# Patient Record
Sex: FEMALE | Race: WHITE | NOT HISPANIC OR LATINO | Employment: FULL TIME | URBAN - METROPOLITAN AREA
[De-identification: names, ages, dates, MRNs, and addresses within clinical notes are randomized per-mention and may not be internally consistent; named-entity substitution may affect disease eponyms.]

---

## 2020-05-25 NOTE — PROGRESS NOTES
"[facility] Note    Chief Complaint ;        Chief Complaint   Patient presents with    Establish Care       History of Present Illness      Tami Ludwig is a 39 y.o. WF presents to est PCP and for well visit.     Asthma - Pt on Albuterol prn and Advair BID.  Fall and winter and allergies trigger her. Seh can go 4 wks without needing it.     Exposure to Covid-19 Virus -Will check COVID ab. She was in [facility] in [place] and had Chest tightness and sore throat at the time.     HCM - Flu - 11/2019;  Tdap - '18?;   MGM - 9/19 - wnl per pt;  PAP - 9/19 - wnl;   HIV Screen - utd?; C-scope - none; Prev PCP - Dr. Jean Carlos Medrano;  Ob/GYN - Dr. Kidd; Derm - Dr. Mer Juan    Past Medical History:  Past Medical History:   Diagnosis Date    Asthma        Past Surgical History:   has a past surgical history that includes Augmentation of breast.    Family History:  family history includes Rheum arthritis in her father and sister; Thyroid disease in her sister.     Social History:  Social History     Tobacco Use    Smoking status: Never Smoker    Smokeless tobacco: Never Used   Substance Use Topics    Alcohol use: Yes     Frequency: 2-3 times a week     Drinks per session: 1 or 2    Drug use: Never       Review of Systems   Constitutional: Negative for chills, diaphoresis and fever.   HENT: Negative for congestion and sore throat.    Eyes: Negative for blurred vision and double vision.   Respiratory: Negative for cough, shortness of breath and wheezing.    Cardiovascular: Negative for chest pain and palpitations.   Gastrointestinal: Positive for diarrhea. Negative for abdominal pain, blood in stool, constipation, heartburn, melena, nausea and vomiting.        "I don't feel like it's problematic".    Genitourinary: Negative for dysuria and hematuria.   Musculoskeletal: Negative for joint pain and myalgias.        RT fifth finger pops and pt has noted some deformity.  Fam h/o RA.    Skin: Negative for " "itching and rash.   Neurological: Negative for dizziness and headaches.   Endo/Heme/Allergies: Does not bruise/bleed easily.   Psychiatric/Behavioral: Negative for depression. The patient is nervous/anxious.         "a little" just the past few mos.  She is concerned about her work.         Medications:  Outpatient Encounter Medications as of 5/27/2020   Medication Sig Dispense Refill    albuterol (PROVENTIL/VENTOLIN HFA) 90 mcg/actuation inhaler Inhale 2 puffs into the lungs every 6 (six) hours as needed for Wheezing. 54 g 0    cetirizine (ZYRTEC) 10 MG tablet Take 10 mg by mouth once daily.      fluticasone propion-salmeterol 115-21 mcg/dose (ADVAIR HFA) 115-21 mcg/actuation HFAA inhaler Inhale 2 puffs into the lungs every 12 (twelve) hours. Controller 3 Inhaler 3    [DISCONTINUED] albuterol (PROVENTIL/VENTOLIN HFA) 90 mcg/actuation inhaler Inhale into the lungs.      [DISCONTINUED] fluticasone propion-salmeterol 115-21 mcg/dose (ADVAIR HFA) 115-21 mcg/actuation HFAA inhaler Inhale 2 puffs into the lungs every 12 (twelve) hours. Controller       No facility-administered encounter medications on file as of 5/27/2020.        Current Outpatient Medications:     albuterol (PROVENTIL/VENTOLIN HFA) 90 mcg/actuation inhaler, Inhale 2 puffs into the lungs every 6 (six) hours as needed for Wheezing., Disp: 54 g, Rfl: 0    cetirizine (ZYRTEC) 10 MG tablet, Take 10 mg by mouth once daily., Disp: , Rfl:     fluticasone propion-salmeterol 115-21 mcg/dose (ADVAIR HFA) 115-21 mcg/actuation HFAA inhaler, Inhale 2 puffs into the lungs every 12 (twelve) hours. Controller, Disp: 3 Inhaler, Rfl: 3    Allergies:  Review of patient's allergies indicates:  No Known Allergies    Health Maintenance:    There is no immunization history on file for this patient.   Health Maintenance   Topic Date Due    Lipid Panel  1982    TETANUS VACCINE  11/03/2000    Pap Smear with HPV Cotest  11/03/2003      Objective:      Vital " "Signs  Temp: 97.5 °F (36.4 °C)  Pulse: 63  Resp: 12  SpO2: 99 %  BP: 112/86  BP Location: Right arm  Height and Weight  Height: 5' 6" (167.6 cm)  Weight: 69.3 kg (152 lb 12.5 oz)  BSA (Calculated - sq m): 1.8 sq meters  BMI (Calculated): 24.7  Weight in (lb) to have BMI = 25: 154.6]    Laboratory:    Physical Exam   Constitutional: She is oriented to person, place, and time. She appears well-developed and well-nourished. No distress.   HENT:   Head: Normocephalic and atraumatic.   Right Ear: External ear normal.   Left Ear: External ear normal.   Mouth/Throat: Oropharynx is clear and moist.   Eyes: Pupils are equal, round, and reactive to light. Conjunctivae and EOM are normal.   Neck: Normal range of motion. Neck supple. No JVD present. No thyromegaly present.   Cardiovascular: Normal rate, regular rhythm and intact distal pulses. Exam reveals no gallop and no friction rub.   No murmur heard.  Pulmonary/Chest: Effort normal and breath sounds normal. No respiratory distress. She has no wheezes. She has no rales.   Abdominal: Soft. Bowel sounds are normal. She exhibits no distension and no mass. There is no tenderness. There is no rebound and no guarding.   Musculoskeletal: Normal range of motion. She exhibits no edema or deformity.   Arthritic changes/Terry neck deformity to Rt fifth digit   Lymphadenopathy:     She has no cervical adenopathy.   Neurological: She is alert and oriented to person, place, and time. No cranial nerve deficit.   Skin: Skin is warm. Capillary refill takes less than 2 seconds. No rash noted. She is not diaphoretic. No cyanosis or erythema. Nails show no clubbing.   Psychiatric: She has a normal mood and affect.   Nursing note and vitals reviewed.          Assessment:       1. Normal physical exam, routine    2. Screening for lipoid disorders    3. Asthma, unspecified asthma severity, unspecified whether complicated, unspecified whether persistent    4. Terry-neck deformity of finger, right  "   5. Family history of rheumatism        Tami Ludwig is a 39 y.o.female with:    1. Normal physical exam, routine  - CBC auto differential; Future  - Comprehensive metabolic panel; Future  - T4, free; Future  - TSH; Future  -Performed today.  Will check Basic labs.  RTC in 1 yr for fu or sooner if needed.    2. Screening for lipoid disorders  - Lipid Panel; Future    3. Asthma, unspecified asthma severity, unspecified whether complicated, unspecified whether persistent  - fluticasone propion-salmeterol 115-21 mcg/dose (ADVAIR HFA) 115-21 mcg/actuation HFAA inhaler; Inhale 2 puffs into the lungs every 12 (twelve) hours. Controller  Dispense: 3 Inhaler; Refill: 3  - albuterol (PROVENTIL/VENTOLIN HFA) 90 mcg/actuation inhaler; Inhale 2 puffs into the lungs every 6 (six) hours as needed for Wheezing.  Dispense: 54 g; Refill: 0  - Controlled.  Cont current.     4. Lenox-neck deformity of finger, right  - Rheumatoid factor; Future  - Cyclic Citrullinated Peptide Antibody, IgG; Future  - Will check RF and anti-CCP    5. Family history of rheumatism  - Rheumatoid factor; Future  - Cyclic Citrullinated Peptide Antibody, IgG; Future  - Will check RF and anti-CCP    6. Exposure to Covid-19 Virus  - COVID-19 (SARS CoV-2) IgG Antibody; Future  I will put in an order.  Please note this may or may not be covered by your insurance.  You may want to check with them first if finances are an issue  Also, a positive result does not mean you can't get COVID -19 again.  This virus is very new and we are still learning about it.  There is not enough information at this time to determine what defines COVID - 19 immunity and how long immunity would last.  You should still socially distance. A positive result indicates you have had the virus  She was in [facility] in [place] and had Chest tightness and sore throat at the time.     Chronic conditions status updated as per HPI.  Other than changes above, cont current medications and maintain follow  up with specialists.  Return to clinic in 12 months for well visit or sooner if needed.    Sally Corley MD  [facility]

## 2020-05-27 ENCOUNTER — LAB VISIT (OUTPATIENT)
Dept: LAB | Facility: HOSPITAL | Age: 40
End: 2020-05-27
Attending: INTERNAL MEDICINE
Payer: COMMERCIAL

## 2020-05-27 ENCOUNTER — OFFICE VISIT (OUTPATIENT)
Dept: PRIMARY CARE CLINIC | Facility: CLINIC | Age: 40
End: 2020-05-27
Payer: COMMERCIAL

## 2020-05-27 VITALS
RESPIRATION RATE: 12 BRPM | TEMPERATURE: 98 F | OXYGEN SATURATION: 99 % | BODY MASS INDEX: 24.55 KG/M2 | HEART RATE: 63 BPM | HEIGHT: 66 IN | SYSTOLIC BLOOD PRESSURE: 112 MMHG | DIASTOLIC BLOOD PRESSURE: 86 MMHG | WEIGHT: 152.75 LBS

## 2020-05-27 DIAGNOSIS — Z20.822 EXPOSURE TO COVID-19 VIRUS: ICD-10-CM

## 2020-05-27 DIAGNOSIS — Z13.220 SCREENING FOR LIPOID DISORDERS: ICD-10-CM

## 2020-05-27 DIAGNOSIS — M20.031 SWAN-NECK DEFORMITY OF FINGER, RIGHT: ICD-10-CM

## 2020-05-27 DIAGNOSIS — Z00.00 NORMAL PHYSICAL EXAM, ROUTINE: ICD-10-CM

## 2020-05-27 DIAGNOSIS — Z00.00 NORMAL PHYSICAL EXAM, ROUTINE: Primary | ICD-10-CM

## 2020-05-27 DIAGNOSIS — Z82.69 FAMILY HISTORY OF RHEUMATISM: ICD-10-CM

## 2020-05-27 DIAGNOSIS — J45.909 ASTHMA, UNSPECIFIED ASTHMA SEVERITY, UNSPECIFIED WHETHER COMPLICATED, UNSPECIFIED WHETHER PERSISTENT: ICD-10-CM

## 2020-05-27 LAB
ALBUMIN SERPL BCP-MCNC: 4.3 G/DL (ref 3.5–5.2)
ALP SERPL-CCNC: 63 U/L (ref 55–135)
ALT SERPL W/O P-5'-P-CCNC: 15 U/L (ref 10–44)
ANION GAP SERPL CALC-SCNC: 8 MMOL/L (ref 8–16)
AST SERPL-CCNC: 17 U/L (ref 10–40)
BASOPHILS # BLD AUTO: 0.09 K/UL (ref 0–0.2)
BASOPHILS NFR BLD: 1.3 % (ref 0–1.9)
BILIRUB SERPL-MCNC: 0.6 MG/DL (ref 0.1–1)
BUN SERPL-MCNC: 8 MG/DL (ref 6–20)
CALCIUM SERPL-MCNC: 9.3 MG/DL (ref 8.7–10.5)
CCP AB SER IA-ACNC: 0.9 U/ML
CHLORIDE SERPL-SCNC: 106 MMOL/L (ref 95–110)
CHOLEST SERPL-MCNC: 164 MG/DL (ref 120–199)
CHOLEST/HDLC SERPL: 2.7 {RATIO} (ref 2–5)
CO2 SERPL-SCNC: 24 MMOL/L (ref 23–29)
CREAT SERPL-MCNC: 0.8 MG/DL (ref 0.5–1.4)
DIFFERENTIAL METHOD: ABNORMAL
EOSINOPHIL # BLD AUTO: 0.3 K/UL (ref 0–0.5)
EOSINOPHIL NFR BLD: 3.9 % (ref 0–8)
ERYTHROCYTE [DISTWIDTH] IN BLOOD BY AUTOMATED COUNT: 12.6 % (ref 11.5–14.5)
EST. GFR  (AFRICAN AMERICAN): >60 ML/MIN/1.73 M^2
EST. GFR  (NON AFRICAN AMERICAN): >60 ML/MIN/1.73 M^2
GLUCOSE SERPL-MCNC: 88 MG/DL (ref 70–110)
HCT VFR BLD AUTO: 43.2 % (ref 37–48.5)
HDLC SERPL-MCNC: 61 MG/DL (ref 40–75)
HDLC SERPL: 37.2 % (ref 20–50)
HGB BLD-MCNC: 13.4 G/DL (ref 12–16)
IMM GRANULOCYTES # BLD AUTO: 0.02 K/UL (ref 0–0.04)
IMM GRANULOCYTES NFR BLD AUTO: 0.3 % (ref 0–0.5)
LDLC SERPL CALC-MCNC: 92.4 MG/DL (ref 63–159)
LYMPHOCYTES # BLD AUTO: 1.5 K/UL (ref 1–4.8)
LYMPHOCYTES NFR BLD: 21.5 % (ref 18–48)
MCH RBC QN AUTO: 30.2 PG (ref 27–31)
MCHC RBC AUTO-ENTMCNC: 31 G/DL (ref 32–36)
MCV RBC AUTO: 97 FL (ref 82–98)
MONOCYTES # BLD AUTO: 0.6 K/UL (ref 0.3–1)
MONOCYTES NFR BLD: 8.3 % (ref 4–15)
NEUTROPHILS # BLD AUTO: 4.5 K/UL (ref 1.8–7.7)
NEUTROPHILS NFR BLD: 64.7 % (ref 38–73)
NONHDLC SERPL-MCNC: 103 MG/DL
NRBC BLD-RTO: 0 /100 WBC
PLATELET # BLD AUTO: 335 K/UL (ref 150–350)
PMV BLD AUTO: 11.2 FL (ref 9.2–12.9)
POTASSIUM SERPL-SCNC: 4 MMOL/L (ref 3.5–5.1)
PROT SERPL-MCNC: 7.4 G/DL (ref 6–8.4)
RBC # BLD AUTO: 4.44 M/UL (ref 4–5.4)
RHEUMATOID FACT SERPL-ACNC: <10 IU/ML (ref 0–15)
SARS-COV-2 IGG SERPLBLD QL IA.RAPID: NEGATIVE
SODIUM SERPL-SCNC: 138 MMOL/L (ref 136–145)
T4 FREE SERPL-MCNC: 0.96 NG/DL (ref 0.71–1.51)
TRIGL SERPL-MCNC: 53 MG/DL (ref 30–150)
TSH SERPL DL<=0.005 MIU/L-ACNC: 0.92 UIU/ML (ref 0.4–4)
WBC # BLD AUTO: 6.98 K/UL (ref 3.9–12.7)

## 2020-05-27 PROCEDURE — 80061 LIPID PANEL: CPT

## 2020-05-27 PROCEDURE — 80053 COMPREHEN METABOLIC PANEL: CPT

## 2020-05-27 PROCEDURE — 84439 ASSAY OF FREE THYROXINE: CPT

## 2020-05-27 PROCEDURE — 85025 COMPLETE CBC W/AUTO DIFF WBC: CPT

## 2020-05-27 PROCEDURE — 99385 PR PREVENTIVE VISIT,NEW,18-39: ICD-10-PCS | Mod: S$GLB,,, | Performed by: INTERNAL MEDICINE

## 2020-05-27 PROCEDURE — 99999 PR PBB SHADOW E&M-EST. PATIENT-LVL III: ICD-10-PCS | Mod: PBBFAC,,, | Performed by: INTERNAL MEDICINE

## 2020-05-27 PROCEDURE — 84443 ASSAY THYROID STIM HORMONE: CPT

## 2020-05-27 PROCEDURE — 86769 SARS-COV-2 COVID-19 ANTIBODY: CPT

## 2020-05-27 PROCEDURE — 36415 COLL VENOUS BLD VENIPUNCTURE: CPT | Mod: PN

## 2020-05-27 PROCEDURE — 99385 PREV VISIT NEW AGE 18-39: CPT | Mod: S$GLB,,, | Performed by: INTERNAL MEDICINE

## 2020-05-27 PROCEDURE — 86200 CCP ANTIBODY: CPT

## 2020-05-27 PROCEDURE — 86431 RHEUMATOID FACTOR QUANT: CPT

## 2020-05-27 PROCEDURE — 99999 PR PBB SHADOW E&M-EST. PATIENT-LVL III: CPT | Mod: PBBFAC,,, | Performed by: INTERNAL MEDICINE

## 2020-05-27 RX ORDER — CETIRIZINE HYDROCHLORIDE 10 MG/1
10 TABLET ORAL DAILY
COMMUNITY

## 2020-05-27 RX ORDER — ALBUTEROL SULFATE 90 UG/1
AEROSOL, METERED RESPIRATORY (INHALATION)
COMMUNITY
End: 2020-05-27 | Stop reason: SDUPTHER

## 2020-05-27 RX ORDER — ALBUTEROL SULFATE 90 UG/1
2 AEROSOL, METERED RESPIRATORY (INHALATION) EVERY 6 HOURS PRN
Qty: 54 G | Refills: 0 | Status: SHIPPED | OUTPATIENT
Start: 2020-05-27 | End: 2021-05-31 | Stop reason: SDUPTHER

## 2020-05-27 RX ORDER — FLUTICASONE PROPIONATE AND SALMETEROL XINAFOATE 115; 21 UG/1; UG/1
2 AEROSOL, METERED RESPIRATORY (INHALATION) EVERY 12 HOURS
COMMUNITY
End: 2020-05-27 | Stop reason: SDUPTHER

## 2020-05-27 RX ORDER — FLUTICASONE PROPIONATE AND SALMETEROL XINAFOATE 115; 21 UG/1; UG/1
2 AEROSOL, METERED RESPIRATORY (INHALATION) EVERY 12 HOURS
Qty: 3 INHALER | Refills: 3 | Status: SHIPPED | OUTPATIENT
Start: 2020-05-27 | End: 2021-05-31 | Stop reason: SDUPTHER

## 2020-05-28 ENCOUNTER — TELEPHONE (OUTPATIENT)
Dept: PRIMARY CARE CLINIC | Facility: CLINIC | Age: 40
End: 2020-05-28

## 2020-05-28 NOTE — TELEPHONE ENCOUNTER
"----- Message from Sally Corley MD sent at 5/28/2020  4:42 PM CDT -----  Please inform pt labs were wnl.  Her anti CCP and Rheumatoid factor - screening tests for Rheumatoid arthritis were both negative.  Her Covid Ab test was negative.  It does not appear she had COVID - 19. Her TFT"s - wnl.  She is not anemic. Her cholesterol is well controlled. Her kidney and liver functions ar normal. No further recs at this time.     Dr. HAM"

## 2020-05-28 NOTE — PROGRESS NOTES
"Please inform pt labs were wnl.  Her anti CCP and Rheumatoid factor - screening tests for Rheumatoid arthritis were both negative.  Her Covid Ab test was negative.  It does not appear she had COVID - 19. Her TFT"s - wnl.  She is not anemic. Her cholesterol is well controlled. Her kidney and liver functions ar normal. No further recs at this time.     Dr. HAM"

## 2021-05-18 ENCOUNTER — PATIENT MESSAGE (OUTPATIENT)
Dept: PRIMARY CARE CLINIC | Facility: CLINIC | Age: 41
End: 2021-05-18

## 2021-05-31 ENCOUNTER — LAB VISIT (OUTPATIENT)
Dept: LAB | Facility: HOSPITAL | Age: 41
End: 2021-05-31
Attending: NURSE PRACTITIONER
Payer: COMMERCIAL

## 2021-05-31 ENCOUNTER — OFFICE VISIT (OUTPATIENT)
Dept: INTERNAL MEDICINE | Facility: CLINIC | Age: 41
End: 2021-05-31
Payer: COMMERCIAL

## 2021-05-31 VITALS
BODY MASS INDEX: 23.38 KG/M2 | TEMPERATURE: 98 F | HEART RATE: 66 BPM | WEIGHT: 145.5 LBS | DIASTOLIC BLOOD PRESSURE: 66 MMHG | SYSTOLIC BLOOD PRESSURE: 104 MMHG | HEIGHT: 66 IN | RESPIRATION RATE: 16 BRPM

## 2021-05-31 DIAGNOSIS — R19.7 DIARRHEA, UNSPECIFIED TYPE: ICD-10-CM

## 2021-05-31 DIAGNOSIS — Z00.00 ANNUAL PHYSICAL EXAM: Primary | ICD-10-CM

## 2021-05-31 DIAGNOSIS — Z82.69 FAMILY HISTORY OF RHEUMATISM: ICD-10-CM

## 2021-05-31 DIAGNOSIS — Z83.2 FAMILY HISTORY OF AUTOIMMUNE DISORDER: ICD-10-CM

## 2021-05-31 DIAGNOSIS — Z13.6 ENCOUNTER FOR LIPID SCREENING FOR CARDIOVASCULAR DISEASE: ICD-10-CM

## 2021-05-31 DIAGNOSIS — Z13.220 ENCOUNTER FOR LIPID SCREENING FOR CARDIOVASCULAR DISEASE: ICD-10-CM

## 2021-05-31 DIAGNOSIS — J45.20 MILD INTERMITTENT ASTHMA, UNSPECIFIED WHETHER COMPLICATED: ICD-10-CM

## 2021-05-31 DIAGNOSIS — Z00.00 ANNUAL PHYSICAL EXAM: ICD-10-CM

## 2021-05-31 DIAGNOSIS — J45.909 ASTHMA, UNSPECIFIED ASTHMA SEVERITY, UNSPECIFIED WHETHER COMPLICATED, UNSPECIFIED WHETHER PERSISTENT: ICD-10-CM

## 2021-05-31 LAB
ALBUMIN SERPL BCP-MCNC: 4.3 G/DL (ref 3.5–5.2)
ALP SERPL-CCNC: 59 U/L (ref 55–135)
ALT SERPL W/O P-5'-P-CCNC: 12 U/L (ref 10–44)
ANION GAP SERPL CALC-SCNC: 9 MMOL/L (ref 8–16)
AST SERPL-CCNC: 16 U/L (ref 10–40)
BASOPHILS # BLD AUTO: 0.08 K/UL (ref 0–0.2)
BASOPHILS NFR BLD: 1 % (ref 0–1.9)
BILIRUB SERPL-MCNC: 0.6 MG/DL (ref 0.1–1)
BUN SERPL-MCNC: 10 MG/DL (ref 6–20)
CALCIUM SERPL-MCNC: 9.4 MG/DL (ref 8.7–10.5)
CHLORIDE SERPL-SCNC: 107 MMOL/L (ref 95–110)
CHOLEST SERPL-MCNC: 151 MG/DL (ref 120–199)
CHOLEST/HDLC SERPL: 2.5 {RATIO} (ref 2–5)
CO2 SERPL-SCNC: 24 MMOL/L (ref 23–29)
CREAT SERPL-MCNC: 0.8 MG/DL (ref 0.5–1.4)
DIFFERENTIAL METHOD: NORMAL
EOSINOPHIL # BLD AUTO: 0.4 K/UL (ref 0–0.5)
EOSINOPHIL NFR BLD: 5.3 % (ref 0–8)
ERYTHROCYTE [DISTWIDTH] IN BLOOD BY AUTOMATED COUNT: 12.6 % (ref 11.5–14.5)
EST. GFR  (AFRICAN AMERICAN): >60 ML/MIN/1.73 M^2
EST. GFR  (NON AFRICAN AMERICAN): >60 ML/MIN/1.73 M^2
GLUCOSE SERPL-MCNC: 91 MG/DL (ref 70–110)
HCT VFR BLD AUTO: 42 % (ref 37–48.5)
HDLC SERPL-MCNC: 61 MG/DL (ref 40–75)
HDLC SERPL: 40.4 % (ref 20–50)
HGB BLD-MCNC: 13.8 G/DL (ref 12–16)
IMM GRANULOCYTES # BLD AUTO: 0.02 K/UL (ref 0–0.04)
IMM GRANULOCYTES NFR BLD AUTO: 0.2 % (ref 0–0.5)
LDLC SERPL CALC-MCNC: 82 MG/DL (ref 63–159)
LYMPHOCYTES # BLD AUTO: 2.3 K/UL (ref 1–4.8)
LYMPHOCYTES NFR BLD: 27.7 % (ref 18–48)
MCH RBC QN AUTO: 30.2 PG (ref 27–31)
MCHC RBC AUTO-ENTMCNC: 32.9 G/DL (ref 32–36)
MCV RBC AUTO: 92 FL (ref 82–98)
MONOCYTES # BLD AUTO: 0.5 K/UL (ref 0.3–1)
MONOCYTES NFR BLD: 6.5 % (ref 4–15)
NEUTROPHILS # BLD AUTO: 5 K/UL (ref 1.8–7.7)
NEUTROPHILS NFR BLD: 59.3 % (ref 38–73)
NONHDLC SERPL-MCNC: 90 MG/DL
NRBC BLD-RTO: 0 /100 WBC
PLATELET # BLD AUTO: 313 K/UL (ref 150–450)
PMV BLD AUTO: 11.3 FL (ref 9.2–12.9)
POTASSIUM SERPL-SCNC: 4.2 MMOL/L (ref 3.5–5.1)
PROT SERPL-MCNC: 7.3 G/DL (ref 6–8.4)
RBC # BLD AUTO: 4.57 M/UL (ref 4–5.4)
SODIUM SERPL-SCNC: 140 MMOL/L (ref 136–145)
T4 FREE SERPL-MCNC: 1.09 NG/DL (ref 0.71–1.51)
TRIGL SERPL-MCNC: 40 MG/DL (ref 30–150)
TSH SERPL DL<=0.005 MIU/L-ACNC: 1.49 UIU/ML (ref 0.4–4)
WBC # BLD AUTO: 8.34 K/UL (ref 3.9–12.7)

## 2021-05-31 PROCEDURE — 99214 OFFICE O/P EST MOD 30 MIN: CPT | Mod: S$GLB,,, | Performed by: NURSE PRACTITIONER

## 2021-05-31 PROCEDURE — 99999 PR PBB SHADOW E&M-EST. PATIENT-LVL IV: CPT | Mod: PBBFAC,,, | Performed by: NURSE PRACTITIONER

## 2021-05-31 PROCEDURE — 1126F AMNT PAIN NOTED NONE PRSNT: CPT | Mod: S$GLB,,, | Performed by: NURSE PRACTITIONER

## 2021-05-31 PROCEDURE — 80061 LIPID PANEL: CPT | Performed by: NURSE PRACTITIONER

## 2021-05-31 PROCEDURE — 3008F PR BODY MASS INDEX (BMI) DOCUMENTED: ICD-10-PCS | Mod: CPTII,S$GLB,, | Performed by: NURSE PRACTITIONER

## 2021-05-31 PROCEDURE — 1126F PR PAIN SEVERITY QUANTIFIED, NO PAIN PRESENT: ICD-10-PCS | Mod: S$GLB,,, | Performed by: NURSE PRACTITIONER

## 2021-05-31 PROCEDURE — 86039 ANTINUCLEAR ANTIBODIES (ANA): CPT | Performed by: NURSE PRACTITIONER

## 2021-05-31 PROCEDURE — 36415 COLL VENOUS BLD VENIPUNCTURE: CPT | Mod: PO | Performed by: NURSE PRACTITIONER

## 2021-05-31 PROCEDURE — 3008F BODY MASS INDEX DOCD: CPT | Mod: CPTII,S$GLB,, | Performed by: NURSE PRACTITIONER

## 2021-05-31 PROCEDURE — 80053 COMPREHEN METABOLIC PANEL: CPT | Performed by: NURSE PRACTITIONER

## 2021-05-31 PROCEDURE — 85025 COMPLETE CBC W/AUTO DIFF WBC: CPT | Performed by: NURSE PRACTITIONER

## 2021-05-31 PROCEDURE — 86038 ANTINUCLEAR ANTIBODIES: CPT | Performed by: NURSE PRACTITIONER

## 2021-05-31 PROCEDURE — 84443 ASSAY THYROID STIM HORMONE: CPT | Performed by: NURSE PRACTITIONER

## 2021-05-31 PROCEDURE — 99999 PR PBB SHADOW E&M-EST. PATIENT-LVL IV: ICD-10-PCS | Mod: PBBFAC,,, | Performed by: NURSE PRACTITIONER

## 2021-05-31 PROCEDURE — 84439 ASSAY OF FREE THYROXINE: CPT | Performed by: NURSE PRACTITIONER

## 2021-05-31 PROCEDURE — 99214 PR OFFICE/OUTPT VISIT, EST, LEVL IV, 30-39 MIN: ICD-10-PCS | Mod: S$GLB,,, | Performed by: NURSE PRACTITIONER

## 2021-05-31 PROCEDURE — 86235 NUCLEAR ANTIGEN ANTIBODY: CPT | Mod: 59 | Performed by: NURSE PRACTITIONER

## 2021-05-31 RX ORDER — FLUTICASONE PROPIONATE AND SALMETEROL XINAFOATE 115; 21 UG/1; UG/1
2 AEROSOL, METERED RESPIRATORY (INHALATION) EVERY 12 HOURS
Qty: 3 INHALER | Refills: 3 | Status: SHIPPED | OUTPATIENT
Start: 2021-05-31 | End: 2022-07-27 | Stop reason: SDUPTHER

## 2021-05-31 RX ORDER — ALBUTEROL SULFATE 90 UG/1
2 AEROSOL, METERED RESPIRATORY (INHALATION) EVERY 6 HOURS PRN
Qty: 54 G | Refills: 3 | Status: SHIPPED | OUTPATIENT
Start: 2021-05-31 | End: 2022-07-27 | Stop reason: SDUPTHER

## 2021-06-02 LAB
ANA PATTERN 1: NORMAL
ANA SER QL IF: POSITIVE
ANA TITR SER IF: NORMAL {TITER}
ANTI SM ANTIBODY: 0.08 RATIO (ref 0–0.99)
ANTI SM/RNP ANTIBODY: 0.12 RATIO (ref 0–0.99)
ANTI-SM INTERPRETATION: NEGATIVE
ANTI-SM/RNP INTERPRETATION: NEGATIVE
ANTI-SSA ANTIBODY: 0.07 RATIO (ref 0–0.99)
ANTI-SSA INTERPRETATION: NEGATIVE
ANTI-SSB ANTIBODY: 0.09 RATIO (ref 0–0.99)
ANTI-SSB INTERPRETATION: NEGATIVE
DSDNA AB SER-ACNC: NORMAL [IU]/ML

## 2021-06-03 ENCOUNTER — PATIENT OUTREACH (OUTPATIENT)
Dept: ADMINISTRATIVE | Facility: OTHER | Age: 41
End: 2021-06-03

## 2021-06-07 ENCOUNTER — PATIENT MESSAGE (OUTPATIENT)
Dept: INTERNAL MEDICINE | Facility: CLINIC | Age: 41
End: 2021-06-07

## 2021-06-07 ENCOUNTER — LAB VISIT (OUTPATIENT)
Dept: LAB | Facility: HOSPITAL | Age: 41
End: 2021-06-07
Payer: COMMERCIAL

## 2021-06-07 ENCOUNTER — TELEPHONE (OUTPATIENT)
Dept: ENDOSCOPY | Facility: HOSPITAL | Age: 41
End: 2021-06-07

## 2021-06-07 ENCOUNTER — OFFICE VISIT (OUTPATIENT)
Dept: GASTROENTEROLOGY | Facility: CLINIC | Age: 41
End: 2021-06-07
Payer: COMMERCIAL

## 2021-06-07 VITALS
SYSTOLIC BLOOD PRESSURE: 117 MMHG | DIASTOLIC BLOOD PRESSURE: 73 MMHG | WEIGHT: 148.56 LBS | HEIGHT: 66 IN | BODY MASS INDEX: 23.88 KG/M2 | HEART RATE: 68 BPM

## 2021-06-07 DIAGNOSIS — Z83.2 FAMILY HISTORY OF AUTOIMMUNE DISORDER: Primary | ICD-10-CM

## 2021-06-07 DIAGNOSIS — R76.8 ANA POSITIVE: ICD-10-CM

## 2021-06-07 DIAGNOSIS — R19.7 DIARRHEA, UNSPECIFIED TYPE: ICD-10-CM

## 2021-06-07 DIAGNOSIS — K21.9 GASTROESOPHAGEAL REFLUX DISEASE, UNSPECIFIED WHETHER ESOPHAGITIS PRESENT: Primary | ICD-10-CM

## 2021-06-07 DIAGNOSIS — Z83.79 FAMILY HISTORY OF CROHN'S DISEASE: ICD-10-CM

## 2021-06-07 DIAGNOSIS — Z12.11 SPECIAL SCREENING FOR MALIGNANT NEOPLASMS, COLON: Primary | ICD-10-CM

## 2021-06-07 LAB
CRP SERPL-MCNC: 3.6 MG/L (ref 0–8.2)
IGA SERPL-MCNC: 180 MG/DL (ref 40–350)

## 2021-06-07 PROCEDURE — 83516 IMMUNOASSAY NONANTIBODY: CPT | Performed by: NURSE PRACTITIONER

## 2021-06-07 PROCEDURE — 1126F AMNT PAIN NOTED NONE PRSNT: CPT | Mod: S$GLB,,, | Performed by: NURSE PRACTITIONER

## 2021-06-07 PROCEDURE — 3008F PR BODY MASS INDEX (BMI) DOCUMENTED: ICD-10-PCS | Mod: CPTII,S$GLB,, | Performed by: NURSE PRACTITIONER

## 2021-06-07 PROCEDURE — 1126F PR PAIN SEVERITY QUANTIFIED, NO PAIN PRESENT: ICD-10-PCS | Mod: S$GLB,,, | Performed by: NURSE PRACTITIONER

## 2021-06-07 PROCEDURE — 99999 PR PBB SHADOW E&M-EST. PATIENT-LVL IV: CPT | Mod: PBBFAC,,, | Performed by: NURSE PRACTITIONER

## 2021-06-07 PROCEDURE — 99999 PR PBB SHADOW E&M-EST. PATIENT-LVL IV: ICD-10-PCS | Mod: PBBFAC,,, | Performed by: NURSE PRACTITIONER

## 2021-06-07 PROCEDURE — 99204 OFFICE O/P NEW MOD 45 MIN: CPT | Mod: S$GLB,,, | Performed by: NURSE PRACTITIONER

## 2021-06-07 PROCEDURE — 99204 PR OFFICE/OUTPT VISIT, NEW, LEVL IV, 45-59 MIN: ICD-10-PCS | Mod: S$GLB,,, | Performed by: NURSE PRACTITIONER

## 2021-06-07 PROCEDURE — 3008F BODY MASS INDEX DOCD: CPT | Mod: CPTII,S$GLB,, | Performed by: NURSE PRACTITIONER

## 2021-06-07 PROCEDURE — 82784 ASSAY IGA/IGD/IGG/IGM EACH: CPT | Performed by: NURSE PRACTITIONER

## 2021-06-07 PROCEDURE — 86140 C-REACTIVE PROTEIN: CPT | Performed by: NURSE PRACTITIONER

## 2021-06-07 PROCEDURE — 36415 COLL VENOUS BLD VENIPUNCTURE: CPT | Performed by: NURSE PRACTITIONER

## 2021-06-07 RX ORDER — PANTOPRAZOLE SODIUM 40 MG/1
40 TABLET, DELAYED RELEASE ORAL DAILY
Qty: 30 TABLET | Refills: 2 | Status: SHIPPED | OUTPATIENT
Start: 2021-06-07 | End: 2022-01-12

## 2021-06-07 RX ORDER — CHOLECALCIFEROL (VITAMIN D3) 25 MCG
1000 TABLET ORAL DAILY
COMMUNITY
End: 2023-04-10

## 2021-06-07 RX ORDER — SODIUM, POTASSIUM,MAG SULFATES 17.5-3.13G
1 SOLUTION, RECONSTITUTED, ORAL ORAL ONCE
Qty: 1 KIT | Refills: 0 | Status: SHIPPED | OUTPATIENT
Start: 2021-06-07 | End: 2021-06-07

## 2021-06-11 LAB — TTG IGA SER-ACNC: 4 UNITS

## 2021-07-09 ENCOUNTER — TELEPHONE (OUTPATIENT)
Dept: ENDOSCOPY | Facility: HOSPITAL | Age: 41
End: 2021-07-09

## 2021-07-12 ENCOUNTER — HOSPITAL ENCOUNTER (OUTPATIENT)
Facility: HOSPITAL | Age: 41
Discharge: HOME | End: 2021-07-12
Attending: STUDENT IN AN ORGANIZED HEALTH CARE EDUCATION/TRAINING PROGRAM | Admitting: STUDENT IN AN ORGANIZED HEALTH CARE EDUCATION/TRAINING PROGRAM
Payer: COMMERCIAL

## 2021-07-12 ENCOUNTER — ANESTHESIA (OUTPATIENT)
Dept: ENDOSCOPY | Facility: HOSPITAL | Age: 41
End: 2021-07-12
Payer: COMMERCIAL

## 2021-07-12 ENCOUNTER — ANESTHESIA EVENT (OUTPATIENT)
Dept: ENDOSCOPY | Facility: HOSPITAL | Age: 41
End: 2021-07-12
Payer: COMMERCIAL

## 2021-07-12 VITALS
WEIGHT: 145 LBS | HEIGHT: 66 IN | OXYGEN SATURATION: 100 % | RESPIRATION RATE: 16 BRPM | HEART RATE: 63 BPM | BODY MASS INDEX: 23.3 KG/M2 | SYSTOLIC BLOOD PRESSURE: 107 MMHG | DIASTOLIC BLOOD PRESSURE: 67 MMHG | TEMPERATURE: 98 F

## 2021-07-12 DIAGNOSIS — R19.7 DIARRHEA, UNSPECIFIED TYPE: Primary | ICD-10-CM

## 2021-07-12 PROCEDURE — 45380 COLONOSCOPY AND BIOPSY: CPT | Mod: ,,, | Performed by: STUDENT IN AN ORGANIZED HEALTH CARE EDUCATION/TRAINING PROGRAM

## 2021-07-12 PROCEDURE — 88305 TISSUE EXAM BY PATHOLOGIST: CPT | Performed by: PATHOLOGY

## 2021-07-12 PROCEDURE — 88305 TISSUE EXAM BY PATHOLOGIST: CPT | Mod: 26,,, | Performed by: PATHOLOGY

## 2021-07-12 PROCEDURE — 45380 COLONOSCOPY AND BIOPSY: CPT | Performed by: STUDENT IN AN ORGANIZED HEALTH CARE EDUCATION/TRAINING PROGRAM

## 2021-07-12 PROCEDURE — 45380 PR COLONOSCOPY,BIOPSY: ICD-10-PCS | Mod: ,,, | Performed by: STUDENT IN AN ORGANIZED HEALTH CARE EDUCATION/TRAINING PROGRAM

## 2021-07-12 PROCEDURE — E9220 PRA ENDO ANESTHESIA: HCPCS | Mod: ,,, | Performed by: NURSE ANESTHETIST, CERTIFIED REGISTERED

## 2021-07-12 PROCEDURE — 27201012 HC FORCEPS, HOT/COLD, DISP: Performed by: STUDENT IN AN ORGANIZED HEALTH CARE EDUCATION/TRAINING PROGRAM

## 2021-07-12 PROCEDURE — 88305 TISSUE EXAM BY PATHOLOGIST: ICD-10-PCS | Mod: 26,,, | Performed by: PATHOLOGY

## 2021-07-12 PROCEDURE — 37000009 HC ANESTHESIA EA ADD 15 MINS: Performed by: STUDENT IN AN ORGANIZED HEALTH CARE EDUCATION/TRAINING PROGRAM

## 2021-07-12 PROCEDURE — 37000008 HC ANESTHESIA 1ST 15 MINUTES: Performed by: STUDENT IN AN ORGANIZED HEALTH CARE EDUCATION/TRAINING PROGRAM

## 2021-07-12 PROCEDURE — E9220 PRA ENDO ANESTHESIA: ICD-10-PCS | Mod: ,,, | Performed by: NURSE ANESTHETIST, CERTIFIED REGISTERED

## 2021-07-12 PROCEDURE — 25000003 PHARM REV CODE 250: Performed by: STUDENT IN AN ORGANIZED HEALTH CARE EDUCATION/TRAINING PROGRAM

## 2021-07-12 PROCEDURE — 63600175 PHARM REV CODE 636 W HCPCS: Performed by: NURSE ANESTHETIST, CERTIFIED REGISTERED

## 2021-07-12 RX ORDER — SODIUM CHLORIDE 9 MG/ML
INJECTION, SOLUTION INTRAVENOUS CONTINUOUS
Status: DISCONTINUED | OUTPATIENT
Start: 2021-07-12 | End: 2021-07-12 | Stop reason: HOSPADM

## 2021-07-12 RX ORDER — PROPOFOL 10 MG/ML
VIAL (ML) INTRAVENOUS
Status: DISCONTINUED | OUTPATIENT
Start: 2021-07-12 | End: 2021-07-12

## 2021-07-12 RX ORDER — PROPOFOL 10 MG/ML
VIAL (ML) INTRAVENOUS CONTINUOUS PRN
Status: DISCONTINUED | OUTPATIENT
Start: 2021-07-12 | End: 2021-07-12

## 2021-07-12 RX ORDER — LIDOCAINE HCL/PF 100 MG/5ML
SYRINGE (ML) INTRAVENOUS
Status: DISCONTINUED | OUTPATIENT
Start: 2021-07-12 | End: 2021-07-12

## 2021-07-12 RX ADMIN — PROPOFOL 150 MCG/KG/MIN: 10 INJECTION, EMULSION INTRAVENOUS at 11:07

## 2021-07-12 RX ADMIN — PROPOFOL 100 MG: 10 INJECTION, EMULSION INTRAVENOUS at 11:07

## 2021-07-12 RX ADMIN — PROPOFOL 50 MG: 10 INJECTION, EMULSION INTRAVENOUS at 11:07

## 2021-07-12 RX ADMIN — SODIUM CHLORIDE: 0.9 INJECTION, SOLUTION INTRAVENOUS at 11:07

## 2021-07-12 RX ADMIN — Medication 60 MG: at 11:07

## 2021-07-12 RX ADMIN — PROPOFOL 200 MG: 10 INJECTION, EMULSION INTRAVENOUS at 11:07

## 2021-07-21 LAB
FINAL PATHOLOGIC DIAGNOSIS: NORMAL
Lab: NORMAL

## 2021-07-22 ENCOUNTER — PATIENT MESSAGE (OUTPATIENT)
Dept: GASTROENTEROLOGY | Facility: CLINIC | Age: 41
End: 2021-07-22

## 2021-07-22 ENCOUNTER — PATIENT MESSAGE (OUTPATIENT)
Dept: GASTROENTEROLOGY | Facility: HOSPITAL | Age: 41
End: 2021-07-22

## 2021-07-22 RX ORDER — BUDESONIDE 3 MG/1
CAPSULE, COATED PELLETS ORAL
Qty: 130 CAPSULE | Refills: 0 | Status: SHIPPED | OUTPATIENT
Start: 2021-07-22 | End: 2022-01-13 | Stop reason: ALTCHOICE

## 2021-08-15 ENCOUNTER — LAB VISIT (OUTPATIENT)
Dept: PRIMARY CARE CLINIC | Facility: OTHER | Age: 41
End: 2021-08-15
Payer: COMMERCIAL

## 2021-08-15 DIAGNOSIS — Z01.818 PRE-OP TESTING: ICD-10-CM

## 2021-08-15 PROCEDURE — U0003 INFECTIOUS AGENT DETECTION BY NUCLEIC ACID (DNA OR RNA); SEVERE ACUTE RESPIRATORY SYNDROME CORONAVIRUS 2 (SARS-COV-2) (CORONAVIRUS DISEASE [COVID-19]), AMPLIFIED PROBE TECHNIQUE, MAKING USE OF HIGH THROUGHPUT TECHNOLOGIES AS DESCRIBED BY CMS-2020-01-R: HCPCS | Performed by: INTERNAL MEDICINE

## 2021-08-16 LAB
SARS-COV-2 RNA RESP QL NAA+PROBE: NOT DETECTED
SARS-COV-2- CYCLE NUMBER: -1

## 2021-12-26 ENCOUNTER — PATIENT MESSAGE (OUTPATIENT)
Dept: ADMINISTRATIVE | Facility: OTHER | Age: 41
End: 2021-12-26
Payer: COMMERCIAL

## 2021-12-26 ENCOUNTER — OFFICE VISIT (OUTPATIENT)
Dept: URGENT CARE | Facility: CLINIC | Age: 41
End: 2021-12-26
Payer: COMMERCIAL

## 2021-12-26 VITALS
TEMPERATURE: 98 F | HEIGHT: 66 IN | DIASTOLIC BLOOD PRESSURE: 77 MMHG | OXYGEN SATURATION: 98 % | SYSTOLIC BLOOD PRESSURE: 122 MMHG | BODY MASS INDEX: 23.3 KG/M2 | RESPIRATION RATE: 18 BRPM | WEIGHT: 145 LBS | HEART RATE: 83 BPM

## 2021-12-26 DIAGNOSIS — R05.9 COUGH: Primary | ICD-10-CM

## 2021-12-26 DIAGNOSIS — J45.21 MILD INTERMITTENT ASTHMATIC BRONCHITIS WITH ACUTE EXACERBATION: ICD-10-CM

## 2021-12-26 LAB
CTP QC/QA: YES
SARS-COV-2 RDRP RESP QL NAA+PROBE: NEGATIVE

## 2021-12-26 PROCEDURE — 1159F MED LIST DOCD IN RCRD: CPT | Mod: CPTII,S$GLB,, | Performed by: PHYSICIAN ASSISTANT

## 2021-12-26 PROCEDURE — 3074F SYST BP LT 130 MM HG: CPT | Mod: CPTII,S$GLB,, | Performed by: PHYSICIAN ASSISTANT

## 2021-12-26 PROCEDURE — 1160F RVW MEDS BY RX/DR IN RCRD: CPT | Mod: CPTII,S$GLB,, | Performed by: PHYSICIAN ASSISTANT

## 2021-12-26 PROCEDURE — U0002 COVID-19 LAB TEST NON-CDC: HCPCS | Mod: QW,S$GLB,, | Performed by: PHYSICIAN ASSISTANT

## 2021-12-26 PROCEDURE — 1159F PR MEDICATION LIST DOCUMENTED IN MEDICAL RECORD: ICD-10-PCS | Mod: CPTII,S$GLB,, | Performed by: PHYSICIAN ASSISTANT

## 2021-12-26 PROCEDURE — 3078F DIAST BP <80 MM HG: CPT | Mod: CPTII,S$GLB,, | Performed by: PHYSICIAN ASSISTANT

## 2021-12-26 PROCEDURE — 3008F PR BODY MASS INDEX (BMI) DOCUMENTED: ICD-10-PCS | Mod: CPTII,S$GLB,, | Performed by: PHYSICIAN ASSISTANT

## 2021-12-26 PROCEDURE — 3008F BODY MASS INDEX DOCD: CPT | Mod: CPTII,S$GLB,, | Performed by: PHYSICIAN ASSISTANT

## 2021-12-26 PROCEDURE — 99214 OFFICE O/P EST MOD 30 MIN: CPT | Mod: S$GLB,,, | Performed by: PHYSICIAN ASSISTANT

## 2021-12-26 PROCEDURE — 1160F PR REVIEW ALL MEDS BY PRESCRIBER/CLIN PHARMACIST DOCUMENTED: ICD-10-PCS | Mod: CPTII,S$GLB,, | Performed by: PHYSICIAN ASSISTANT

## 2021-12-26 PROCEDURE — 3078F PR MOST RECENT DIASTOLIC BLOOD PRESSURE < 80 MM HG: ICD-10-PCS | Mod: CPTII,S$GLB,, | Performed by: PHYSICIAN ASSISTANT

## 2021-12-26 PROCEDURE — 3074F PR MOST RECENT SYSTOLIC BLOOD PRESSURE < 130 MM HG: ICD-10-PCS | Mod: CPTII,S$GLB,, | Performed by: PHYSICIAN ASSISTANT

## 2021-12-26 PROCEDURE — 99214 PR OFFICE/OUTPT VISIT, EST, LEVL IV, 30-39 MIN: ICD-10-PCS | Mod: S$GLB,,, | Performed by: PHYSICIAN ASSISTANT

## 2021-12-26 PROCEDURE — U0002: ICD-10-PCS | Mod: QW,S$GLB,, | Performed by: PHYSICIAN ASSISTANT

## 2021-12-26 RX ORDER — METHYLPREDNISOLONE 4 MG/1
TABLET ORAL
COMMUNITY
Start: 2021-12-24 | End: 2022-01-13 | Stop reason: ALTCHOICE

## 2021-12-26 RX ORDER — PREDNISONE 20 MG/1
40 TABLET ORAL DAILY
Qty: 6 TABLET | Refills: 0 | Status: SHIPPED | OUTPATIENT
Start: 2021-12-26 | End: 2021-12-29

## 2021-12-26 RX ORDER — AZITHROMYCIN 250 MG/1
TABLET, FILM COATED ORAL
Qty: 6 TABLET | Refills: 0 | Status: SHIPPED | OUTPATIENT
Start: 2021-12-26 | End: 2021-12-31

## 2022-01-09 ENCOUNTER — PATIENT OUTREACH (OUTPATIENT)
Dept: ADMINISTRATIVE | Facility: OTHER | Age: 42
End: 2022-01-09
Payer: COMMERCIAL

## 2022-01-10 NOTE — PROGRESS NOTES
Health Maintenance Due   Topic Date Due    Hepatitis C Screening  Never done    Pneumococcal Vaccines (Age 0-64) (1 of 2 - PPSV23) Never done    HIV Screening  Never done    Influenza Vaccine (1) 09/01/2021     Updates were requested from care everywhere.  Chart was reviewed for overdue Proactive [facility] Encounters (PAO) topics (CRS, Breast Cancer Screening, Eye exam)  Health Maintenance has been updated.  LINKS immunization registry triggered.  Immunizations were reconciled.

## 2022-01-12 ENCOUNTER — OFFICE VISIT (OUTPATIENT)
Dept: RHEUMATOLOGY | Facility: CLINIC | Age: 42
End: 2022-01-12
Payer: COMMERCIAL

## 2022-01-12 VITALS
DIASTOLIC BLOOD PRESSURE: 75 MMHG | HEIGHT: 66 IN | WEIGHT: 154.31 LBS | SYSTOLIC BLOOD PRESSURE: 113 MMHG | BODY MASS INDEX: 24.8 KG/M2 | HEART RATE: 65 BPM

## 2022-01-12 DIAGNOSIS — Z83.2 FAMILY HISTORY OF AUTOIMMUNE DISORDER: ICD-10-CM

## 2022-01-12 DIAGNOSIS — R76.8 ANA POSITIVE: ICD-10-CM

## 2022-01-12 PROCEDURE — 99204 OFFICE O/P NEW MOD 45 MIN: CPT | Mod: S$GLB,,,

## 2022-01-12 PROCEDURE — 99999 PR PBB SHADOW E&M-EST. PATIENT-LVL III: CPT | Mod: PBBFAC,,,

## 2022-01-12 PROCEDURE — 99999 PR PBB SHADOW E&M-EST. PATIENT-LVL III: ICD-10-PCS | Mod: PBBFAC,,,

## 2022-01-12 PROCEDURE — 99204 PR OFFICE/OUTPT VISIT, NEW, LEVL IV, 45-59 MIN: ICD-10-PCS | Mod: S$GLB,,,

## 2022-01-12 NOTE — PROGRESS NOTES
"Subjective:       Patient ID: Tami Ludwig is a 41 y.o. female.    Chief Complaint: Disease Management    HPI   Patient is a 40yo F who presents today upon being referred by her PCP for a positive RAEANN.  This was obtained as patient has a family history of autoimmune disease with her father and sister having RA.  She was having symptoms of chronic diarrhea that was intermittently bloody.  Father was diagnosed with Crohn's.  She ended up having a colonoscopy which showed colitis but no IBD.  Otherwise, she has a history of well-controlled asthma, but no other symptoms.  Denies any significant joint pain.  Had Covid diagnosis on .      Review of Systems    No skin rashes, malar rash, photosensitivity   No telangiectasias   No calcinosis   No psoriasis   No patchy alopecia   No oral and nasal ulcers   No dry eyes and dry mouth   No pleurisy or any cardiopulmonary complaints   No dysphagia, diplopia, dysphonia or muscle weakness   No n/v/d/c   No acid reflux  No raynaud's  No digital ulcers   No cytopenias   No renal issues   No blood clots   No fever, chills, night sweats, weight loss or loss of appetite   1 healthy pregnancy, 1 miscarriage (18 weeks); 1   No new onset headaches   No recurrent conjunctivitis, uveitis, scleritis or episcleritis   +Chronic or bloody diarrhea (Diagnosed with colitis)  No vaginal or urethral d/c; No STDs; No genital ulcers     FamHx: Father with Crohn's and RA, Sister with RA and Hashimoto's    SocHx: Tobacco: Never; Alcohol: Occasional; Occupation:         Objective:   /75   Pulse 65   Ht 5' 6" (1.676 m)   Wt 70 kg (154 lb 5.2 oz)   BMI 24.91 kg/m²      Physical Exam   Constitutional: She is oriented to person, place, and time and well-developed, well-nourished, and in no distress. No distress.   HENT:   Head: Normocephalic and atraumatic.   Eyes: Pupils are equal, round, and reactive to light. EOM are normal. Right eye exhibits no discharge. Left " eye exhibits no discharge. No scleral icterus.   Cardiovascular: Normal rate and intact distal pulses.   Pulmonary/Chest: Effort normal. No respiratory distress.   Abdominal: Soft. She exhibits no distension. There is no abdominal tenderness. There is no rebound and no guarding.   Musculoskeletal:         General: No edema.      Cervical back: Normal range of motion and neck supple.   Neurological: She is alert and oriented to person, place, and time.   Skin: Skin is warm and dry. No rash noted. She is not diaphoretic. No erythema.   Psychiatric: Mood and affect normal.   Nursing note and vitals reviewed.       No data to display     Assessment/Plan:       1. Family history of autoimmune disorder    2. RAEANN positive          Patient is a 40 yo F who presents to be evaluated for positive RAEANN that was obtained secondary to a family history of autoimmune disease and her GI symptoms.  RAEANN was positive at 1:640 homogenous pattern with a negative profile.  Her history, rheumatologic review of systems and exam were not concerning for any autoimmune disease at this time.  She does have history of one miscarriage. However, does not mean she may not develop a rheumatologic condition in the future.  Also possible her RAEANN is positive secondary to her family history of rheumatologic disease.    We will see her back in one year and likely obtain some repeat labwork at that time (including RAEANN).  Holding off today as she just had Covid and this may skew lab results.        Problem List Items Addressed This Visit    None     Visit Diagnoses     Family history of autoimmune disorder        RAEANN positive                  RTC in 1 year    Patient discussed with Dr. Galan.    Hussain Rutherford MD, PGY-5  [facility]

## 2022-01-13 NOTE — PROGRESS NOTES
I have personally taken the history and examined the patient and concur with the resident's note as above.  Despite the positive RAEANN, she has no signs or symptoms of an underlying connective tissue disease.  I am not ordering any laboratory studies at this time because of her recent COVID infection.  We will see her in follow-up in 6 months unless she develops new symptoms before then.

## 2022-02-28 ENCOUNTER — OFFICE VISIT (OUTPATIENT)
Dept: URGENT CARE | Facility: CLINIC | Age: 42
End: 2022-02-28
Payer: COMMERCIAL

## 2022-02-28 VITALS
WEIGHT: 145 LBS | HEART RATE: 76 BPM | OXYGEN SATURATION: 99 % | HEIGHT: 66 IN | RESPIRATION RATE: 18 BRPM | TEMPERATURE: 98 F | DIASTOLIC BLOOD PRESSURE: 76 MMHG | SYSTOLIC BLOOD PRESSURE: 118 MMHG | BODY MASS INDEX: 23.3 KG/M2

## 2022-02-28 DIAGNOSIS — J01.90 ACUTE BACTERIAL RHINOSINUSITIS: Primary | ICD-10-CM

## 2022-02-28 DIAGNOSIS — B96.89 ACUTE BACTERIAL RHINOSINUSITIS: Primary | ICD-10-CM

## 2022-02-28 PROCEDURE — 3008F BODY MASS INDEX DOCD: CPT | Mod: CPTII,S$GLB,,

## 2022-02-28 PROCEDURE — 99213 PR OFFICE/OUTPT VISIT, EST, LEVL III, 20-29 MIN: ICD-10-PCS | Mod: S$GLB,,,

## 2022-02-28 PROCEDURE — 99213 OFFICE O/P EST LOW 20 MIN: CPT | Mod: S$GLB,,,

## 2022-02-28 PROCEDURE — 3074F SYST BP LT 130 MM HG: CPT | Mod: CPTII,S$GLB,,

## 2022-02-28 PROCEDURE — 1159F PR MEDICATION LIST DOCUMENTED IN MEDICAL RECORD: ICD-10-PCS | Mod: CPTII,S$GLB,,

## 2022-02-28 PROCEDURE — 3008F PR BODY MASS INDEX (BMI) DOCUMENTED: ICD-10-PCS | Mod: CPTII,S$GLB,,

## 2022-02-28 PROCEDURE — 3074F PR MOST RECENT SYSTOLIC BLOOD PRESSURE < 130 MM HG: ICD-10-PCS | Mod: CPTII,S$GLB,,

## 2022-02-28 PROCEDURE — 3078F PR MOST RECENT DIASTOLIC BLOOD PRESSURE < 80 MM HG: ICD-10-PCS | Mod: CPTII,S$GLB,,

## 2022-02-28 PROCEDURE — 3078F DIAST BP <80 MM HG: CPT | Mod: CPTII,S$GLB,,

## 2022-02-28 PROCEDURE — 1160F RVW MEDS BY RX/DR IN RCRD: CPT | Mod: CPTII,S$GLB,,

## 2022-02-28 PROCEDURE — 1160F PR REVIEW ALL MEDS BY PRESCRIBER/CLIN PHARMACIST DOCUMENTED: ICD-10-PCS | Mod: CPTII,S$GLB,,

## 2022-02-28 PROCEDURE — 1159F MED LIST DOCD IN RCRD: CPT | Mod: CPTII,S$GLB,,

## 2022-02-28 RX ORDER — FLUTICASONE PROPIONATE 50 MCG
2 SPRAY, SUSPENSION (ML) NASAL DAILY
Qty: 9.9 ML | Refills: 0 | Status: SHIPPED | OUTPATIENT
Start: 2022-02-28 | End: 2022-03-07

## 2022-02-28 RX ORDER — AMOXICILLIN 875 MG/1
875 TABLET, FILM COATED ORAL 2 TIMES DAILY
Qty: 20 TABLET | Refills: 0 | Status: SHIPPED | OUTPATIENT
Start: 2022-02-28 | End: 2022-03-10

## 2022-02-28 NOTE — PROGRESS NOTES
"Subjective:       Patient ID: Tami Ludwig is a 41 y.o. female.    Vitals:  height is 5' 6" (1.676 m) and weight is 65.8 kg (145 lb). Her temperature is 98.4 °F (36.9 °C). Her blood pressure is 118/76 and her pulse is 76. Her respiration is 18 and oxygen saturation is 99%.     Chief Complaint: URI    40 yo female presents to urgent care for evaluation. Patient c/o sinus congestion, sinus pressure/pain below her eyes, and a sore throat for the last 2 weeks. States her symptoms seemed to get better with zyrtec after a few days but then worsened. Of note, she had covid in January of this year. Denies fever, CP, SOB, weakness, abdominal sx, and other associated complaints.      URI   This is a new problem. The problem has been gradually worsening. Associated symptoms include congestion and sinus pain. Pertinent negatives include no abdominal pain, chest pain, coughing, ear pain, headaches, joint pain, nausea, neck pain, sneezing, sore throat, swollen glands, vomiting or wheezing. She has tried acetaminophen and antihistamine for the symptoms.       Constitution: Negative for chills, fatigue, fever and unexpected weight change.   HENT: Positive for congestion, sinus pain and sinus pressure. Negative for ear pain, ear discharge, sore throat and trouble swallowing.    Neck: Negative for neck pain and neck stiffness.   Cardiovascular: Negative for chest pain.   Eyes: Negative for eye pain.   Respiratory: Negative for cough, sputum production, shortness of breath and wheezing.    Gastrointestinal: Negative for abdominal pain, nausea and vomiting.   Musculoskeletal: Negative for muscle ache.   Allergic/Immunologic: Negative for sneezing.   Neurological: Negative for dizziness and headaches.       Objective:      Physical Exam   Constitutional: She is oriented to person, place, and time. She appears well-developed. She is cooperative.  Non-toxic appearance. She does not appear ill. No distress.   HENT:   Head: " Normocephalic and atraumatic.   Ears:   Right Ear: Hearing, tympanic membrane, external ear and ear canal normal.   Left Ear: Hearing, tympanic membrane, external ear and ear canal normal.   Nose: No mucosal edema, rhinorrhea or nasal deformity. No epistaxis. Right sinus exhibits maxillary sinus tenderness. Right sinus exhibits no frontal sinus tenderness. Left sinus exhibits maxillary sinus tenderness. Left sinus exhibits no frontal sinus tenderness.   Mouth/Throat: Uvula is midline, oropharynx is clear and moist and mucous membranes are normal. No trismus in the jaw. Normal dentition. No uvula swelling. No oropharyngeal exudate, posterior oropharyngeal edema or posterior oropharyngeal erythema.   Eyes: Conjunctivae and lids are normal. No scleral icterus.   Neck: Trachea normal and phonation normal. Neck supple. No edema present. No erythema present. No neck rigidity present.   Cardiovascular: Normal rate, regular rhythm, normal heart sounds and normal pulses.   Pulmonary/Chest: Effort normal and breath sounds normal. No respiratory distress. She has no decreased breath sounds. She has no rhonchi.   Abdominal: Normal appearance.   Musculoskeletal: Normal range of motion.         General: No deformity. Normal range of motion.   Neurological: She is alert and oriented to person, place, and time. She exhibits normal muscle tone. Coordination normal.   Skin: Skin is warm, dry, intact, not diaphoretic and not pale.   Psychiatric: Her speech is normal and behavior is normal. Judgment and thought content normal.   Nursing note and vitals reviewed.          Assessment:       1. Acute bacterial rhinosinusitis          Plan:       Patient well appearing, vitals stable. Patient in no acute respiratory distress. Discussed negative results with patient. Patient verbalized understanding. Educated patient on viral vs bacterial sinus infection/upper respiratory infection. Discussed treatment with antibiotics at this time given sx  > 10 days despite OTC measures.. If symptoms do not resolve, return to clinic for further evaluation. Discussed discharge instructions with patient, he has no further questions at this time. Patient exits exam room in no distress.       Acute bacterial rhinosinusitis  -     amoxicillin (AMOXIL) 875 MG tablet; Take 1 tablet (875 mg total) by mouth 2 (two) times daily. for 10 days  Dispense: 20 tablet; Refill: 0  -     fluticasone propionate (FLONASE) 50 mcg/actuation nasal spray; 2 sprays (100 mcg total) by Each Nostril route once daily. for 7 days  Dispense: 9.9 mL; Refill: 0      Patient Instructions   PLEASE READ YOUR DISCHARGE INSTRUCTIONS ENTIRELY AS IT CONTAINS IMPORTANT INFORMATION.    Take antibiotics to completion    Please drink plenty of fluids.    Please get plenty of rest.    Please return here or go to the Emergency Department for any concerns or worsening of condition.      Tylenol or ibuprofen can also be used as directed for pain and fever unless you have an allergy to them or medical condition such as stomach ulcers, kidney or liver disease or blood thinners etc for which you should not be taking these type of medications.   YOU CAN ALTERNATE TYLENOL AND IBUPROFEN EVERY 3-4 HOURS. Take 1000mg (2 pills) of Extra Strength Acetaminophen (Tylenol) every 6 hours and 600mg (3 pills) of Ibuprofen (Motrin/Advil) every 6 hours, alternating the two so that every 3 hours you take one or the other. Start with the Tylenol, then 3 hours later take the Ibuprofen, then 3 hours later take the Tylenol again, and so on.       For your allergy symptoms and/or runny nose, sinus/ear pressure, congestion:     Please take an over the counter antihistamine medication (allegra/Claritin/Zyrtec) of your choice as directed and Sudafed (the one behind the counter at the pharmacy) or Mucinex-D (if it gives you funny heartbeats you can switch to regular mucinex). If you do have Hypertension or palpitations, it is safe to take  Coricidin HBP for relief of sinus symptoms.   If you have high blood pressure -- AVOID DECONGESTANTS (I.e., Phenylephrine and Pseudoephedrine). Coricidin HBP is safe to take.     Use over the counter flonase or nasocort: one spray each nostril twice daily OR two sprays each nostril once daily until nares dry out, unless you have Glaucoma.   If you find this dries your nose out or your nose bleeds, try using over the counter nasal saline a few minutes prior to using the flonase to moisten the lining of your nose and throughout the day as needed.   Flonase/nasal spray use directions:  1) Use once per day.  2) Blow nose first.  3) Close one nostril and spray flonase up the other nostril while inhaling gently.   4) If you inhale to aggressively, you will have a bitter taste in the back of your mouth.       You can try breathe right strips at night to help you breathe.  A cool mist humidifier in bedroom may help with cough and relieve stuffy nose.     Sinus rinses DO NOT USE TAP WATER, if you must, water must be a rolling boil for 1 minute, let it cool, then use.  May use distilled water, or over the counter nasal saline rinses.  Vics vapor rub in shower to help open nasal passages.  May use nasal gel to keep passages moisturized.  May use Nasal saline sprays during the day for added relief of congestion.   For those who go to the gym, please do not use the sauna or steam room now to clear sinuses.      Sore throat recommendations: Warm fluids, warm salt water gargles, throat lozenges, tea, honey, soup, rest, hydration.      Cough     Honey with adan to soothe your throat    Robitussin or Delsyum for cough suppressant for dry cough.    Mucinex DM or products containing Guaifenesin or Dextromethorphan for expectorant (wet cough).    Take prescription cough meds (pills) as prescribed; take prescription cough syrup at night as needed for cough.  Do not take both the prescribed cough pills and syrup at the same time or  within 6 hours of each other.  Do not take the cough syrup with any other sedative medication as it can can cause drowsiness. Do not operate any heavy machinery, drink or drive while taking the cough syrup.    Try taking half a dose first of the cough syrup to see how it affects you.       Please follow up with your primary care doctor or specialist in the next 48-72hrs as needed and if no improvement    If you  smoke, please stop smoking.      Please return or see your primary care doctor if you develop new or worsening symptoms.     Please arrange follow up with your primary medical clinic as soon as possible. You must understand that you've received an Urgent Care treatment only and that you may be released before all of your medical problems are known or treated. You, the patient, will arrange for follow up as instructed. If your symptoms worsen or fail to improve you should go to the Emergency Room.

## 2022-07-27 ENCOUNTER — OFFICE VISIT (OUTPATIENT)
Dept: INTERNAL MEDICINE | Facility: CLINIC | Age: 42
End: 2022-07-27
Payer: COMMERCIAL

## 2022-07-27 ENCOUNTER — PATIENT MESSAGE (OUTPATIENT)
Dept: INTERNAL MEDICINE | Facility: CLINIC | Age: 42
End: 2022-07-27

## 2022-07-27 ENCOUNTER — LAB VISIT (OUTPATIENT)
Dept: LAB | Facility: HOSPITAL | Age: 42
End: 2022-07-27
Attending: NURSE PRACTITIONER
Payer: COMMERCIAL

## 2022-07-27 VITALS
OXYGEN SATURATION: 98 % | TEMPERATURE: 98 F | HEART RATE: 87 BPM | SYSTOLIC BLOOD PRESSURE: 110 MMHG | DIASTOLIC BLOOD PRESSURE: 70 MMHG | WEIGHT: 155 LBS | BODY MASS INDEX: 25.01 KG/M2

## 2022-07-27 DIAGNOSIS — M25.50 ARTHRALGIA, UNSPECIFIED JOINT: ICD-10-CM

## 2022-07-27 DIAGNOSIS — Z00.00 ANNUAL PHYSICAL EXAM: ICD-10-CM

## 2022-07-27 DIAGNOSIS — Z13.6 ENCOUNTER FOR LIPID SCREENING FOR CARDIOVASCULAR DISEASE: ICD-10-CM

## 2022-07-27 DIAGNOSIS — Z13.220 ENCOUNTER FOR LIPID SCREENING FOR CARDIOVASCULAR DISEASE: ICD-10-CM

## 2022-07-27 DIAGNOSIS — Z82.61 FAMILY HISTORY OF RHEUMATOID ARTHRITIS: ICD-10-CM

## 2022-07-27 DIAGNOSIS — Z00.00 ANNUAL PHYSICAL EXAM: Primary | ICD-10-CM

## 2022-07-27 DIAGNOSIS — J45.909 ASTHMA, UNSPECIFIED ASTHMA SEVERITY, UNSPECIFIED WHETHER COMPLICATED, UNSPECIFIED WHETHER PERSISTENT: ICD-10-CM

## 2022-07-27 DIAGNOSIS — M25.551 PAIN OF BOTH HIP JOINTS: ICD-10-CM

## 2022-07-27 DIAGNOSIS — M25.552 PAIN OF BOTH HIP JOINTS: ICD-10-CM

## 2022-07-27 DIAGNOSIS — Z13.1 SCREENING FOR DIABETES MELLITUS: ICD-10-CM

## 2022-07-27 LAB
ALBUMIN SERPL BCP-MCNC: 4.3 G/DL (ref 3.5–5.2)
ALP SERPL-CCNC: 61 U/L (ref 55–135)
ALT SERPL W/O P-5'-P-CCNC: 74 U/L (ref 10–44)
ANION GAP SERPL CALC-SCNC: 7 MMOL/L (ref 8–16)
AST SERPL-CCNC: 41 U/L (ref 10–40)
BASOPHILS # BLD AUTO: 0.07 K/UL (ref 0–0.2)
BASOPHILS NFR BLD: 0.9 % (ref 0–1.9)
BILIRUB SERPL-MCNC: 0.6 MG/DL (ref 0.1–1)
BUN SERPL-MCNC: 10 MG/DL (ref 6–20)
CALCIUM SERPL-MCNC: 9.8 MG/DL (ref 8.7–10.5)
CHLORIDE SERPL-SCNC: 103 MMOL/L (ref 95–110)
CHOLEST SERPL-MCNC: 193 MG/DL (ref 120–199)
CHOLEST/HDLC SERPL: 2.5 {RATIO} (ref 2–5)
CO2 SERPL-SCNC: 27 MMOL/L (ref 23–29)
CREAT SERPL-MCNC: 0.8 MG/DL (ref 0.5–1.4)
DIFFERENTIAL METHOD: ABNORMAL
EOSINOPHIL # BLD AUTO: 0.7 K/UL (ref 0–0.5)
EOSINOPHIL NFR BLD: 8.6 % (ref 0–8)
ERYTHROCYTE [DISTWIDTH] IN BLOOD BY AUTOMATED COUNT: 12.9 % (ref 11.5–14.5)
EST. GFR  (AFRICAN AMERICAN): >60 ML/MIN/1.73 M^2
EST. GFR  (NON AFRICAN AMERICAN): >60 ML/MIN/1.73 M^2
ESTIMATED AVG GLUCOSE: 94 MG/DL (ref 68–131)
GLUCOSE SERPL-MCNC: 93 MG/DL (ref 70–110)
HBA1C MFR BLD: 4.9 % (ref 4–5.6)
HCT VFR BLD AUTO: 43.6 % (ref 37–48.5)
HDLC SERPL-MCNC: 78 MG/DL (ref 40–75)
HDLC SERPL: 40.4 % (ref 20–50)
HGB BLD-MCNC: 14.3 G/DL (ref 12–16)
IMM GRANULOCYTES # BLD AUTO: 0.02 K/UL (ref 0–0.04)
IMM GRANULOCYTES NFR BLD AUTO: 0.2 % (ref 0–0.5)
LDLC SERPL CALC-MCNC: 102.8 MG/DL (ref 63–159)
LYMPHOCYTES # BLD AUTO: 1.9 K/UL (ref 1–4.8)
LYMPHOCYTES NFR BLD: 23.7 % (ref 18–48)
MCH RBC QN AUTO: 30.4 PG (ref 27–31)
MCHC RBC AUTO-ENTMCNC: 32.8 G/DL (ref 32–36)
MCV RBC AUTO: 93 FL (ref 82–98)
MONOCYTES # BLD AUTO: 0.5 K/UL (ref 0.3–1)
MONOCYTES NFR BLD: 5.9 % (ref 4–15)
NEUTROPHILS # BLD AUTO: 4.9 K/UL (ref 1.8–7.7)
NEUTROPHILS NFR BLD: 60.7 % (ref 38–73)
NONHDLC SERPL-MCNC: 115 MG/DL
NRBC BLD-RTO: 0 /100 WBC
PLATELET # BLD AUTO: 408 K/UL (ref 150–450)
PMV BLD AUTO: 10.8 FL (ref 9.2–12.9)
POTASSIUM SERPL-SCNC: 4.2 MMOL/L (ref 3.5–5.1)
PROT SERPL-MCNC: 7.4 G/DL (ref 6–8.4)
RBC # BLD AUTO: 4.71 M/UL (ref 4–5.4)
RHEUMATOID FACT SERPL-ACNC: <13 IU/ML (ref 0–15)
SODIUM SERPL-SCNC: 137 MMOL/L (ref 136–145)
T4 FREE SERPL-MCNC: 1.02 NG/DL (ref 0.71–1.51)
TRIGL SERPL-MCNC: 61 MG/DL (ref 30–150)
TSH SERPL DL<=0.005 MIU/L-ACNC: 1.53 UIU/ML (ref 0.4–4)
WBC # BLD AUTO: 8.07 K/UL (ref 3.9–12.7)

## 2022-07-27 PROCEDURE — 1159F MED LIST DOCD IN RCRD: CPT | Mod: CPTII,S$GLB,, | Performed by: NURSE PRACTITIONER

## 2022-07-27 PROCEDURE — 84443 ASSAY THYROID STIM HORMONE: CPT | Performed by: NURSE PRACTITIONER

## 2022-07-27 PROCEDURE — 99999 PR PBB SHADOW E&M-EST. PATIENT-LVL III: CPT | Mod: PBBFAC,,, | Performed by: NURSE PRACTITIONER

## 2022-07-27 PROCEDURE — 80053 COMPREHEN METABOLIC PANEL: CPT | Performed by: NURSE PRACTITIONER

## 2022-07-27 PROCEDURE — 3078F DIAST BP <80 MM HG: CPT | Mod: CPTII,S$GLB,, | Performed by: NURSE PRACTITIONER

## 2022-07-27 PROCEDURE — 83036 HEMOGLOBIN GLYCOSYLATED A1C: CPT | Performed by: NURSE PRACTITIONER

## 2022-07-27 PROCEDURE — 1160F RVW MEDS BY RX/DR IN RCRD: CPT | Mod: CPTII,S$GLB,, | Performed by: NURSE PRACTITIONER

## 2022-07-27 PROCEDURE — 3008F BODY MASS INDEX DOCD: CPT | Mod: CPTII,S$GLB,, | Performed by: NURSE PRACTITIONER

## 2022-07-27 PROCEDURE — 85025 COMPLETE CBC W/AUTO DIFF WBC: CPT | Performed by: NURSE PRACTITIONER

## 2022-07-27 PROCEDURE — 86431 RHEUMATOID FACTOR QUANT: CPT | Performed by: NURSE PRACTITIONER

## 2022-07-27 PROCEDURE — 3078F PR MOST RECENT DIASTOLIC BLOOD PRESSURE < 80 MM HG: ICD-10-PCS | Mod: CPTII,S$GLB,, | Performed by: NURSE PRACTITIONER

## 2022-07-27 PROCEDURE — 3074F PR MOST RECENT SYSTOLIC BLOOD PRESSURE < 130 MM HG: ICD-10-PCS | Mod: CPTII,S$GLB,, | Performed by: NURSE PRACTITIONER

## 2022-07-27 PROCEDURE — 1159F PR MEDICATION LIST DOCUMENTED IN MEDICAL RECORD: ICD-10-PCS | Mod: CPTII,S$GLB,, | Performed by: NURSE PRACTITIONER

## 2022-07-27 PROCEDURE — 99999 PR PBB SHADOW E&M-EST. PATIENT-LVL III: ICD-10-PCS | Mod: PBBFAC,,, | Performed by: NURSE PRACTITIONER

## 2022-07-27 PROCEDURE — 36415 COLL VENOUS BLD VENIPUNCTURE: CPT | Performed by: NURSE PRACTITIONER

## 2022-07-27 PROCEDURE — 80061 LIPID PANEL: CPT | Performed by: NURSE PRACTITIONER

## 2022-07-27 PROCEDURE — 99395 PR PREVENTIVE VISIT,EST,18-39: ICD-10-PCS | Mod: S$GLB,,, | Performed by: NURSE PRACTITIONER

## 2022-07-27 PROCEDURE — 3008F PR BODY MASS INDEX (BMI) DOCUMENTED: ICD-10-PCS | Mod: CPTII,S$GLB,, | Performed by: NURSE PRACTITIONER

## 2022-07-27 PROCEDURE — 84439 ASSAY OF FREE THYROXINE: CPT | Performed by: NURSE PRACTITIONER

## 2022-07-27 PROCEDURE — 99395 PREV VISIT EST AGE 18-39: CPT | Mod: S$GLB,,, | Performed by: NURSE PRACTITIONER

## 2022-07-27 PROCEDURE — 1160F PR REVIEW ALL MEDS BY PRESCRIBER/CLIN PHARMACIST DOCUMENTED: ICD-10-PCS | Mod: CPTII,S$GLB,, | Performed by: NURSE PRACTITIONER

## 2022-07-27 PROCEDURE — 3074F SYST BP LT 130 MM HG: CPT | Mod: CPTII,S$GLB,, | Performed by: NURSE PRACTITIONER

## 2022-07-27 RX ORDER — SPIRONOLACTONE 100 MG/1
100 TABLET, FILM COATED ORAL DAILY
COMMUNITY
Start: 2022-03-03 | End: 2023-04-10

## 2022-07-27 RX ORDER — FLUTICASONE PROPIONATE AND SALMETEROL XINAFOATE 115; 21 UG/1; UG/1
2 AEROSOL, METERED RESPIRATORY (INHALATION) EVERY 12 HOURS
Qty: 12 G | Refills: 6 | Status: SHIPPED | OUTPATIENT
Start: 2022-07-27 | End: 2023-08-26 | Stop reason: SDUPTHER

## 2022-07-27 RX ORDER — ALBUTEROL SULFATE 90 UG/1
2 AEROSOL, METERED RESPIRATORY (INHALATION) EVERY 6 HOURS PRN
Qty: 54 G | Refills: 3 | Status: SHIPPED | OUTPATIENT
Start: 2022-07-27 | End: 2023-04-10

## 2022-07-27 NOTE — PROGRESS NOTES
[facility] Note    Chief Complaint      Chief Complaint   Patient presents with    Annual Exam     History of Present Illness      Tami Ludwig is a 41 y.o. female patient of Dr. Wheeler with chronic conditions of who presents today for annual. Pt feeling well, no complaints, denies sob or cp. Reviewed meds, history, allergies and v/s. No concerns of bladder function, modified diet, stay hydrated and active.   Works as , travels a lot  Comes to appt alone  Practices safety measures such as wearing her seatbelt    Needs med refills    Labs- ordered  [place] [facility] [place]  Eye exams- due  Follows with GI for chronic colitis-  Eats restricted diet, no dairy, nuts  Colonoscopy- 2021, repeat in 7 years  Derm- Dr. Juan manages spironolactone  Rheumatology- managing f/h of RF        Vaccines:  covid- pfizer x 3, has had covid in past  Tdap-2018  Flu shot-does not get        Health Maintenance   Topic Date Due    Hepatitis C Screening  Never done    Colonoscopy  07/12/2028    TETANUS VACCINE  08/14/2028    Lipid Panel  Completed       Past Medical History:   Diagnosis Date    Asthma     Chronic diarrhea        Past Surgical History:   Procedure Laterality Date    APPENDECTOMY      AUGMENTATION OF BREAST      BREAST SURGERY  December 2006    COLONOSCOPY N/A 07/12/2021    Procedure: COLONOSCOPY;  Surgeon: Raffaele Trotter MD;  Location: [facility] ([address] [place] [address]);  Service: Endoscopy;  Laterality: N/A;  Fully vacc Covid-19 (will upload to record or bring card) - pg       family history includes Arthritis in her father and sister; Cancer (age of onset: 66) in her father; Hypertension in her father and mother; Kidney disease in her maternal grandmother; Rheum arthritis in her father and sister; Stroke in her paternal grandfather; Thyroid disease in her sister.    Social History     Tobacco Use    Smoking status: Never Smoker    Smokeless tobacco: Never Used   Substance Use  Topics    Alcohol use: Yes     Alcohol/week: 2.0 standard drinks     Types: 2 Drinks containing 0.5 oz of alcohol per week    Drug use: Never       Review of Systems   HENT: Negative for hearing loss.    Eyes: Negative for discharge.   Respiratory: Negative for wheezing.    Cardiovascular: Negative for chest pain and palpitations.   Gastrointestinal: Positive for diarrhea. Negative for blood in stool, constipation and vomiting.   Genitourinary: Negative for dysuria and hematuria.   Musculoskeletal: Negative for neck pain.   Neurological: Negative for weakness and headaches.   Endo/Heme/Allergies: Negative for polydipsia.        Outpatient Encounter Medications as of 7/27/2022   Medication Sig Dispense Refill    ascorbic acid, vitamin C, (VITAMIN C) 100 MG tablet Take 100 mg by mouth once daily.      cetirizine (ZYRTEC) 10 MG tablet Take 10 mg by mouth once daily.      spironolactone (ALDACTONE) 100 MG tablet Take 100 mg by mouth once daily.      vitamin D (VITAMIN D3) 1000 units Tab Take 1,000 Units by mouth once daily.      [DISCONTINUED] albuterol (PROVENTIL/VENTOLIN HFA) 90 mcg/actuation inhaler Inhale 2 puffs into the lungs every 6 (six) hours as needed for Wheezing. 54 g 3    [DISCONTINUED] fluticasone propion-salmeterol 115-21 mcg/dose (ADVAIR HFA) 115-21 mcg/actuation HFAA inhaler Inhale 2 puffs into the lungs every 12 (twelve) hours. Controller 3 Inhaler 3    albuterol (PROVENTIL/VENTOLIN HFA) 90 mcg/actuation inhaler Inhale 2 puffs into the lungs every 6 (six) hours as needed for Wheezing. 54 g 3    fluticasone propion-salmeterol 115-21 mcg/dose (ADVAIR HFA) 115-21 mcg/actuation HFAA inhaler Inhale 2 puffs into the lungs every 12 (twelve) hours. Controller 12 g 6     No facility-administered encounter medications on file as of 7/27/2022.        Review of patient's allergies indicates:  No Known Allergies    Physical Exam      Vital Signs  Temp: 98 °F (36.7 °C)  Pulse: 87  SpO2: 98 %  BP:  110/70  Height and Weight  Weight: 70.3 kg (154 lb 15.7 oz)    Physical Exam  Vitals and nursing note reviewed.   Constitutional:       General: She is not in acute distress.     Appearance: Normal appearance. She is well-developed. She is not ill-appearing.   HENT:      Head: Normocephalic and atraumatic.      Right Ear: External ear normal.      Left Ear: External ear normal.   Eyes:      Conjunctiva/sclera: Conjunctivae normal.      Pupils: Pupils are equal, round, and reactive to light.   Neck:      Thyroid: No thyromegaly.      Vascular: No JVD.      Trachea: No tracheal deviation.   Cardiovascular:      Rate and Rhythm: Normal rate and regular rhythm.      Heart sounds: Normal heart sounds. No murmur heard.  Pulmonary:      Effort: Pulmonary effort is normal.      Breath sounds: Normal breath sounds.   Chest:      Chest wall: No tenderness.   Abdominal:      General: Bowel sounds are normal.      Palpations: Abdomen is soft.      Tenderness: There is no abdominal tenderness. There is no guarding.   Musculoskeletal:         General: Normal range of motion.      Cervical back: Normal range of motion and neck supple.   Lymphadenopathy:      Cervical: No cervical adenopathy.   Skin:     General: Skin is warm and dry.   Neurological:      Mental Status: She is alert and oriented to person, place, and time.   Psychiatric:         Behavior: Behavior normal.         Thought Content: Thought content normal.         Judgment: Judgment normal.          Laboratory:  CBC:  Lab Results   Component Value Date    WBC 8.34 05/31/2021    RBC 4.57 05/31/2021    HGB 13.8 05/31/2021    HCT 42.0 05/31/2021     05/31/2021    MCV 92 05/31/2021    MCH 30.2 05/31/2021    MCHC 32.9 05/31/2021    MCHC 31.0 (L) 05/27/2020     CMP:  Lab Results   Component Value Date    GLU 91 05/31/2021    CALCIUM 9.4 05/31/2021    ALBUMIN 4.3 05/31/2021    PROT 7.3 05/31/2021     05/31/2021    K 4.2 05/31/2021    CO2 24 05/31/2021      05/31/2021    BUN 10 05/31/2021    ALKPHOS 59 05/31/2021    ALT 12 05/31/2021    AST 16 05/31/2021    BILITOT 0.6 05/31/2021    BILITOT 0.6 05/27/2020     URINALYSIS:  No results found for: COLORU, CLARITYU, SPECGRAV, PHUR, PROTEINUA, GLUCOSEU, BILIRUBINCON, BLOODU, WBCU, RBCU, BACTERIA, MUCUS, NITRITE, LEUKOCYTESUR, UROBILINOGEN, HYALINECASTS   LIPIDS:  Lab Results   Component Value Date    TSH 1.485 05/31/2021    TSH 0.924 05/27/2020    HDL 61 05/31/2021    HDL 61 05/27/2020    CHOL 151 05/31/2021    CHOL 164 05/27/2020    TRIG 40 05/31/2021    TRIG 53 05/27/2020    LDLCALC 82.0 05/31/2021    LDLCALC 92.4 05/27/2020    CHOLHDL 40.4 05/31/2021    CHOLHDL 37.2 05/27/2020    NONHDLCHOL 90 05/31/2021    NONHDLCHOL 103 05/27/2020    TOTALCHOLEST 2.5 05/31/2021    TOTALCHOLEST 2.7 05/27/2020     TSH:  Lab Results   Component Value Date    TSH 1.485 05/31/2021    TSH 0.924 05/27/2020     A1C:  No results found for: HGBA1C      Assessment/Plan     Tami Ludwig is a 41 y.o.female with:    Annual physical exam  -     CBC Auto Differential; Future; Expected date: 07/27/2022  -     Comprehensive Metabolic Panel; Future; Expected date: 07/27/2022  -     Hemoglobin A1C; Future; Expected date: 07/27/2022  -     Lipid Panel; Future; Expected date: 07/27/2022  -     T4, Free; Future; Expected date: 07/27/2022  -     TSH; Future; Expected date: 07/27/2022    Asthma, unspecified asthma severity, unspecified whether complicated, unspecified whether persistent  -     fluticasone propion-salmeterol 115-21 mcg/dose (ADVAIR HFA) 115-21 mcg/actuation HFAA inhaler; Inhale 2 puffs into the lungs every 12 (twelve) hours. Controller  Dispense: 12 g; Refill: 6  -     albuterol (PROVENTIL/VENTOLIN HFA) 90 mcg/actuation inhaler; Inhale 2 puffs into the lungs every 6 (six) hours as needed for Wheezing.  Dispense: 54 g; Refill: 3    Encounter for lipid screening for cardiovascular disease  -     CBC Auto Differential; Future; Expected date:  07/27/2022  -     Comprehensive Metabolic Panel; Future; Expected date: 07/27/2022  -     Hemoglobin A1C; Future; Expected date: 07/27/2022  -     Lipid Panel; Future; Expected date: 07/27/2022  -     T4, Free; Future; Expected date: 07/27/2022  -     TSH; Future; Expected date: 07/27/2022    Screening for diabetes mellitus  -     CBC Auto Differential; Future; Expected date: 07/27/2022  -     Comprehensive Metabolic Panel; Future; Expected date: 07/27/2022  -     Hemoglobin A1C; Future; Expected date: 07/27/2022  -     Lipid Panel; Future; Expected date: 07/27/2022  -     T4, Free; Future; Expected date: 07/27/2022  -     TSH; Future; Expected date: 07/27/2022          I spent 40 minutes on the day of this encounter for preparing for, evaluating, treating, and managing this patient.      -Continue current medications and maintain follow up with specialists.  Return to clinic in 1 year with Dr. Corley or sooner for any concerns   No follow-ups on file.       TYRA GreenfieldC  [facility]

## 2022-11-30 ENCOUNTER — OFFICE VISIT (OUTPATIENT)
Dept: URGENT CARE | Facility: CLINIC | Age: 42
End: 2022-11-30
Payer: COMMERCIAL

## 2022-11-30 VITALS
BODY MASS INDEX: 24.75 KG/M2 | TEMPERATURE: 98 F | SYSTOLIC BLOOD PRESSURE: 127 MMHG | WEIGHT: 154 LBS | HEART RATE: 89 BPM | OXYGEN SATURATION: 98 % | DIASTOLIC BLOOD PRESSURE: 82 MMHG | RESPIRATION RATE: 18 BRPM | HEIGHT: 66 IN

## 2022-11-30 DIAGNOSIS — R05.9 COUGH, UNSPECIFIED TYPE: ICD-10-CM

## 2022-11-30 DIAGNOSIS — J18.9 PNEUMONIA OF LEFT LOWER LOBE DUE TO INFECTIOUS ORGANISM: Primary | ICD-10-CM

## 2022-11-30 LAB
CTP QC/QA: YES
SARS-COV-2 AG RESP QL IA.RAPID: NEGATIVE

## 2022-11-30 PROCEDURE — 71046 X-RAY EXAM CHEST 2 VIEWS: CPT | Mod: FY,S$GLB,, | Performed by: RADIOLOGY

## 2022-11-30 PROCEDURE — 96372 PR INJECTION,THERAP/PROPH/DIAG2ST, IM OR SUBCUT: ICD-10-PCS | Mod: 59,S$GLB,, | Performed by: NURSE PRACTITIONER

## 2022-11-30 PROCEDURE — 1160F RVW MEDS BY RX/DR IN RCRD: CPT | Mod: CPTII,S$GLB,, | Performed by: NURSE PRACTITIONER

## 2022-11-30 PROCEDURE — 96372 THER/PROPH/DIAG INJ SC/IM: CPT | Mod: 59,S$GLB,, | Performed by: NURSE PRACTITIONER

## 2022-11-30 PROCEDURE — 3079F PR MOST RECENT DIASTOLIC BLOOD PRESSURE 80-89 MM HG: ICD-10-PCS | Mod: CPTII,S$GLB,, | Performed by: NURSE PRACTITIONER

## 2022-11-30 PROCEDURE — 1159F PR MEDICATION LIST DOCUMENTED IN MEDICAL RECORD: ICD-10-PCS | Mod: CPTII,S$GLB,, | Performed by: NURSE PRACTITIONER

## 2022-11-30 PROCEDURE — 71046 XR CHEST PA AND LATERAL: ICD-10-PCS | Mod: FY,S$GLB,, | Performed by: RADIOLOGY

## 2022-11-30 PROCEDURE — 87811 SARS CORONAVIRUS 2 ANTIGEN POCT, MANUAL READ: ICD-10-PCS | Mod: QW,S$GLB,, | Performed by: NURSE PRACTITIONER

## 2022-11-30 PROCEDURE — 1160F PR REVIEW ALL MEDS BY PRESCRIBER/CLIN PHARMACIST DOCUMENTED: ICD-10-PCS | Mod: CPTII,S$GLB,, | Performed by: NURSE PRACTITIONER

## 2022-11-30 PROCEDURE — 94640 PR INHAL RX, AIRWAY OBST/DX SPUTUM INDUCT: ICD-10-PCS | Mod: S$GLB,,, | Performed by: NURSE PRACTITIONER

## 2022-11-30 PROCEDURE — 3008F BODY MASS INDEX DOCD: CPT | Mod: CPTII,S$GLB,, | Performed by: NURSE PRACTITIONER

## 2022-11-30 PROCEDURE — 3074F SYST BP LT 130 MM HG: CPT | Mod: CPTII,S$GLB,, | Performed by: NURSE PRACTITIONER

## 2022-11-30 PROCEDURE — 94640 AIRWAY INHALATION TREATMENT: CPT | Mod: S$GLB,,, | Performed by: NURSE PRACTITIONER

## 2022-11-30 PROCEDURE — 3079F DIAST BP 80-89 MM HG: CPT | Mod: CPTII,S$GLB,, | Performed by: NURSE PRACTITIONER

## 2022-11-30 PROCEDURE — 87811 SARS-COV-2 COVID19 W/OPTIC: CPT | Mod: QW,S$GLB,, | Performed by: NURSE PRACTITIONER

## 2022-11-30 PROCEDURE — 3044F PR MOST RECENT HEMOGLOBIN A1C LEVEL <7.0%: ICD-10-PCS | Mod: CPTII,S$GLB,, | Performed by: NURSE PRACTITIONER

## 2022-11-30 PROCEDURE — 3074F PR MOST RECENT SYSTOLIC BLOOD PRESSURE < 130 MM HG: ICD-10-PCS | Mod: CPTII,S$GLB,, | Performed by: NURSE PRACTITIONER

## 2022-11-30 PROCEDURE — 99214 PR OFFICE/OUTPT VISIT, EST, LEVL IV, 30-39 MIN: ICD-10-PCS | Mod: 25,S$GLB,, | Performed by: NURSE PRACTITIONER

## 2022-11-30 PROCEDURE — 1159F MED LIST DOCD IN RCRD: CPT | Mod: CPTII,S$GLB,, | Performed by: NURSE PRACTITIONER

## 2022-11-30 PROCEDURE — 3044F HG A1C LEVEL LT 7.0%: CPT | Mod: CPTII,S$GLB,, | Performed by: NURSE PRACTITIONER

## 2022-11-30 PROCEDURE — 99214 OFFICE O/P EST MOD 30 MIN: CPT | Mod: 25,S$GLB,, | Performed by: NURSE PRACTITIONER

## 2022-11-30 PROCEDURE — 3008F PR BODY MASS INDEX (BMI) DOCUMENTED: ICD-10-PCS | Mod: CPTII,S$GLB,, | Performed by: NURSE PRACTITIONER

## 2022-11-30 RX ORDER — CEFTRIAXONE 1 G/1
1 INJECTION, POWDER, FOR SOLUTION INTRAMUSCULAR; INTRAVENOUS
Status: COMPLETED | OUTPATIENT
Start: 2022-11-30 | End: 2022-11-30

## 2022-11-30 RX ORDER — DOXYCYCLINE 100 MG/1
100 CAPSULE ORAL 2 TIMES DAILY
Qty: 14 CAPSULE | Refills: 0 | Status: SHIPPED | OUTPATIENT
Start: 2022-11-30 | End: 2022-12-07

## 2022-11-30 RX ORDER — PROMETHAZINE HYDROCHLORIDE AND DEXTROMETHORPHAN HYDROBROMIDE 6.25; 15 MG/5ML; MG/5ML
5 SYRUP ORAL NIGHTLY PRN
Qty: 120 ML | Refills: 0 | Status: SHIPPED | OUTPATIENT
Start: 2022-11-30 | End: 2022-12-10

## 2022-11-30 RX ORDER — AMOXICILLIN AND CLAVULANATE POTASSIUM 875; 125 MG/1; MG/1
1 TABLET, FILM COATED ORAL 2 TIMES DAILY
Qty: 14 TABLET | Refills: 0 | Status: SHIPPED | OUTPATIENT
Start: 2022-11-30 | End: 2022-12-07

## 2022-11-30 RX ORDER — AZITHROMYCIN 250 MG/1
250 TABLET, FILM COATED ORAL
COMMUNITY
Start: 2022-11-25 | End: 2023-04-10

## 2022-11-30 RX ORDER — IPRATROPIUM BROMIDE 0.5 MG/2.5ML
0.5 SOLUTION RESPIRATORY (INHALATION)
Status: COMPLETED | OUTPATIENT
Start: 2022-11-30 | End: 2022-11-30

## 2022-11-30 RX ORDER — ALBUTEROL SULFATE 0.83 MG/ML
2.5 SOLUTION RESPIRATORY (INHALATION)
Status: COMPLETED | OUTPATIENT
Start: 2022-11-30 | End: 2022-11-30

## 2022-11-30 RX ORDER — BENZONATATE 200 MG/1
200 CAPSULE ORAL 3 TIMES DAILY PRN
Qty: 30 CAPSULE | Refills: 0 | Status: SHIPPED | OUTPATIENT
Start: 2022-11-30 | End: 2022-12-10

## 2022-11-30 RX ORDER — PREDNISONE 20 MG/1
20 TABLET ORAL 2 TIMES DAILY
COMMUNITY
Start: 2022-11-25 | End: 2023-04-10

## 2022-11-30 RX ADMIN — IPRATROPIUM BROMIDE 0.5 MG: 0.5 SOLUTION RESPIRATORY (INHALATION) at 10:11

## 2022-11-30 RX ADMIN — CEFTRIAXONE 1 G: 1 INJECTION, POWDER, FOR SOLUTION INTRAMUSCULAR; INTRAVENOUS at 11:11

## 2022-11-30 RX ADMIN — ALBUTEROL SULFATE 2.5 MG: 0.83 SOLUTION RESPIRATORY (INHALATION) at 10:11

## 2022-11-30 NOTE — PROGRESS NOTES
"Subjective:       Patient ID: Tami Ludwig is a 42 y.o. female.    Vitals:  height is 5' 6" (1.676 m) and weight is 69.9 kg (154 lb). Her oral temperature is 98.3 °F (36.8 °C). Her blood pressure is 127/82 and her pulse is 89. Her respiration is 18 and oxygen saturation is 98%.     Chief Complaint: Cough    42 yr old female came in with complaints of cough and chest congestion. Her symptoms started about 6 days ago while in [place].  She went to an  there at the time and was not tested for anything.  She notes that she completed 5 days of prednisone and a zpak with no relief.  She is coughing worse at night.  She notes that she did run fever as recent as two days ago, none today or yesterday.  Denies a history of pneumonia.  She does have a history of asthma.  She is not experiencing wheezing.      Cough  This is a new problem. The current episode started in the past 7 days. The problem has been gradually worsening. The problem occurs constantly. The cough is Productive of sputum. Associated symptoms include chills, a fever, headaches, nasal congestion, postnasal drip and a sore throat. Pertinent negatives include no chest pain, ear congestion, ear pain, heartburn, hemoptysis, myalgias, rash, rhinorrhea, shortness of breath, sweats, weight loss or wheezing. Nothing aggravates the symptoms. She has tried nothing for the symptoms.     Constitution: Positive for chills, fatigue and fever.   HENT:  Positive for postnasal drip and sore throat. Negative for ear pain and congestion.    Cardiovascular:  Negative for chest pain.   Respiratory:  Positive for cough, sputum production and asthma. Negative for chest tightness, bloody sputum, shortness of breath and wheezing.    Gastrointestinal:  Negative for heartburn.   Musculoskeletal:  Negative for muscle ache.   Skin:  Negative for rash.   Allergic/Immunologic: Positive for asthma.   Neurological:  Positive for headaches.     Objective:      Physical Exam "   Constitutional: She is oriented to person, place, and time. She appears well-developed. She is cooperative.  Non-toxic appearance. She does not appear ill. No distress.   HENT:   Head: Normocephalic and atraumatic.   Ears:   Right Ear: Hearing, tympanic membrane, external ear and ear canal normal.   Left Ear: Hearing, tympanic membrane, external ear and ear canal normal.   Nose: Nose normal. No mucosal edema, rhinorrhea or nasal deformity. No epistaxis. Right sinus exhibits no maxillary sinus tenderness and no frontal sinus tenderness. Left sinus exhibits no maxillary sinus tenderness and no frontal sinus tenderness.   Mouth/Throat: Uvula is midline, oropharynx is clear and moist and mucous membranes are normal. No trismus in the jaw. Normal dentition. No uvula swelling. No oropharyngeal exudate, posterior oropharyngeal edema or posterior oropharyngeal erythema.   Eyes: Conjunctivae and lids are normal. No scleral icterus.   Neck: Trachea normal and phonation normal. Neck supple. No edema present. No erythema present. No neck rigidity present.   Cardiovascular: Normal rate, regular rhythm, normal heart sounds and normal pulses.   Pulmonary/Chest: Effort normal. No respiratory distress. She has no decreased breath sounds. She has rhonchi in the right lower field and the left lower field.   Abdominal: Normal appearance.   Musculoskeletal: Normal range of motion.         General: No deformity. Normal range of motion.   Neurological: She is alert and oriented to person, place, and time. She exhibits normal muscle tone. Coordination normal.   Skin: Skin is warm, dry, intact, not diaphoretic and not pale.   Psychiatric: Her speech is normal and behavior is normal. Judgment and thought content normal.   Nursing note and vitals reviewed.      X-Ray Chest PA And Lateral    Result Date: 11/30/2022  EXAMINATION: XR CHEST PA AND LATERAL TECHNIQUE: Two views of the chest were obtained, with PA and lateral projections  submitted. COMPARISON: No prior chest radiographs are available for comparison purposes.  Clinical information obtained from the electronic medical record indicates cough/chest congestion. FINDINGS: Heart size is normal, as is the appearance of the pulmonary vascularity.  Minimal pulmonary parenchymal opacity is seen in the left lower lung zone, particularly evident on the lateral view, and is consistent with some patchy airspace consolidation in the left lower lobe.  Clinical correlation is essential, but this may represent a minimal acute pneumonia.  The right lung and the mid and upper lung zones on the left side are clear, and are free of significant airspace consolidation or volume loss.  No pleural fluid.  No hilar or mediastinal mass lesion.  No pneumothorax.     Abnormal chest radiograph, demonstrating minimal patchy airspace consolidation in the left lower lobe.  Clinical correlation is essential, but the findings are consistent with acute pneumonia. This report was flagged in Epic as abnormal. Electronically signed by: Galen Barton MD Date:    11/30/2022 Time:    10:50    Assessment:       1. Pneumonia of left lower lobe due to infectious organism    2. Cough, unspecified type            Plan:       Xray reviewed.    Rechecked post breathing treatment.  She is feeling fine.  Her repeat pulse ox was 98% on room air.  She is not wheezing to auscultation.    Pneumonia of left lower lobe due to infectious organism  -     amoxicillin-clavulanate 875-125mg (AUGMENTIN) 875-125 mg per tablet; Take 1 tablet by mouth 2 (two) times daily. for 7 days  Dispense: 14 tablet; Refill: 0  -     doxycycline (MONODOX) 100 MG capsule; Take 1 capsule (100 mg total) by mouth 2 (two) times daily. for 7 days  Dispense: 14 capsule; Refill: 0  -     cefTRIAXone injection 1 g    Cough, unspecified type  -     SARS Coronavirus 2 Antigen, POCT Manual Read  -     X-Ray Chest PA And Lateral; Future; Expected date: 11/30/2022  -      ipratropium 0.02 % nebulizer solution 0.5 mg  -     albuterol nebulizer solution 2.5 mg  -     benzonatate (TESSALON) 200 MG capsule; Take 1 capsule (200 mg total) by mouth 3 (three) times daily as needed for Cough.  Dispense: 30 capsule; Refill: 0  -     promethazine-dextromethorphan (PROMETHAZINE-DM) 6.25-15 mg/5 mL Syrp; Take 5 mLs by mouth nightly as needed (cough).  Dispense: 120 mL; Refill: 0       Patient Instructions   Please drink plenty of fluids.  Please get plenty of rest.  Please return here or go to the Emergency Department for any concerns or worsening of condition.  If you were prescribed antibiotics, please take them to completion.  Consider adding over-the-counter PROBIOTICS to decrease some side-effects associated with antibiotics. When a person takes antibiotics, both the harmful bacteria and the beneficial bacteria are killed. A reduction of beneficial bacteria can lead to digestive problems, such as diarrhea, yeast infections and urinary tract infections.  If you do not have Hypertension or any history of palpitations, it is ok to take over the counter Sudafed or Mucinex D or Allegra-D or Claritin-D or Zyrtec-D.  If you do take one of the above, it is ok to combine that with plain over the counter Mucinex or Allegra or Claritin or Zyrtec.  If for example you are taking Zyrtec -D, you can combine that with Mucinex, but not Mucinex-D.  If you are taking Mucinex-D, you can combine that with plain Allegra or Claritin or Zyrtec.   If you do have Hypertension or palpitations, it is safe to take Coricidin HBP for relief of sinus symptoms.  If not allergic, please take over the counter Tylenol (Acetaminophen) and/or Motrin (Ibuprofen) as directed for control of pain and/or fever.  Please follow up with your primary care doctor or specialist as needed.    If you  smoke, please stop smoking.

## 2022-11-30 NOTE — PATIENT INSTRUCTIONS
Please drink plenty of fluids.  Please get plenty of rest.  Please return here or go to the Emergency Department for any concerns or worsening of condition.  If you were prescribed antibiotics, please take them to completion.  Consider adding over-the-counter PROBIOTICS to decrease some side-effects associated with antibiotics. When a person takes antibiotics, both the harmful bacteria and the beneficial bacteria are killed. A reduction of beneficial bacteria can lead to digestive problems, such as diarrhea, yeast infections and urinary tract infections.  If you do not have Hypertension or any history of palpitations, it is ok to take over the counter Sudafed or Mucinex D or Allegra-D or Claritin-D or Zyrtec-D.  If you do take one of the above, it is ok to combine that with plain over the counter Mucinex or Allegra or Claritin or Zyrtec.  If for example you are taking Zyrtec -D, you can combine that with Mucinex, but not Mucinex-D.  If you are taking Mucinex-D, you can combine that with plain Allegra or Claritin or Zyrtec.   If you do have Hypertension or palpitations, it is safe to take Coricidin HBP for relief of sinus symptoms.  If not allergic, please take over the counter Tylenol (Acetaminophen) and/or Motrin (Ibuprofen) as directed for control of pain and/or fever.  Please follow up with your primary care doctor or specialist as needed.    If you  smoke, please stop smoking.

## 2023-04-04 ENCOUNTER — OFFICE VISIT (OUTPATIENT)
Dept: URGENT CARE | Facility: CLINIC | Age: 43
End: 2023-04-04
Payer: COMMERCIAL

## 2023-04-04 VITALS
HEART RATE: 72 BPM | OXYGEN SATURATION: 97 % | WEIGHT: 150 LBS | HEIGHT: 66 IN | SYSTOLIC BLOOD PRESSURE: 122 MMHG | BODY MASS INDEX: 24.11 KG/M2 | DIASTOLIC BLOOD PRESSURE: 81 MMHG | TEMPERATURE: 98 F | RESPIRATION RATE: 20 BRPM

## 2023-04-04 DIAGNOSIS — U07.1 COVID-19 VIRUS INFECTION: ICD-10-CM

## 2023-04-04 DIAGNOSIS — J45.901 EXACERBATION OF ASTHMA, UNSPECIFIED ASTHMA SEVERITY, UNSPECIFIED WHETHER PERSISTENT: Primary | ICD-10-CM

## 2023-04-04 DIAGNOSIS — R05.9 COUGH, UNSPECIFIED TYPE: ICD-10-CM

## 2023-04-04 DIAGNOSIS — R06.02 SHORTNESS OF BREATH: ICD-10-CM

## 2023-04-04 LAB
CTP QC/QA: YES
SARS-COV-2 AG RESP QL IA.RAPID: POSITIVE

## 2023-04-04 PROCEDURE — 87811 SARS-COV-2 COVID19 W/OPTIC: CPT | Mod: QW,S$GLB,, | Performed by: NURSE PRACTITIONER

## 2023-04-04 PROCEDURE — 87811 SARS CORONAVIRUS 2 ANTIGEN POCT, MANUAL READ: ICD-10-PCS | Mod: QW,S$GLB,, | Performed by: NURSE PRACTITIONER

## 2023-04-04 PROCEDURE — 99214 OFFICE O/P EST MOD 30 MIN: CPT | Mod: S$GLB,,, | Performed by: NURSE PRACTITIONER

## 2023-04-04 PROCEDURE — 99214 PR OFFICE/OUTPT VISIT, EST, LEVL IV, 30-39 MIN: ICD-10-PCS | Mod: S$GLB,,, | Performed by: NURSE PRACTITIONER

## 2023-04-04 RX ORDER — LIOTHYRONINE SODIUM 50 UG/1
25 TABLET ORAL DAILY
COMMUNITY
End: 2023-04-10

## 2023-04-04 RX ORDER — ALBUTEROL SULFATE 0.83 MG/ML
2.5 SOLUTION RESPIRATORY (INHALATION) EVERY 6 HOURS PRN
Qty: 45 ML | Refills: 0 | Status: SHIPPED | OUTPATIENT
Start: 2023-04-04

## 2023-04-04 RX ORDER — PREDNISONE 20 MG/1
40 TABLET ORAL DAILY
Qty: 10 TABLET | Refills: 0 | Status: SHIPPED | OUTPATIENT
Start: 2023-04-04 | End: 2023-04-10

## 2023-04-04 NOTE — PROGRESS NOTES
"Subjective:      Patient ID: Tami Ludwig is a 42 y.o. female.    Vitals:  height is 5' 6" (1.676 m) and weight is 68 kg (150 lb). Her oral temperature is 97.6 °F (36.4 °C). Her blood pressure is 122/81 and her pulse is 72. Her respiration is 20 and oxygen saturation is 97%.     Chief Complaint: Cough (Nasal/ chest congestion)    43yo female pt reports dry cough and intermittent SOB x12 days.  Reports that she has a HX of asthma, reports that she started having worsening in SOB and needed to use albuterol rescue inhaler while she was on a ski trip 12 days ago.  Reports that symptoms initially improved upon returning home, but have now started to re-occur within past 5 days.  Denies fever/chills, denies n/v/d, denies abd pain.  Denies chest pain.  Denies attempting any other medications or treatments for symptoms.  Denies known sick contacts.  Reports COVID vaccination, denies flu vaccination.    Sinus Problem  This is a new problem. The current episode started in the past 7 days. The problem has been gradually worsening since onset. There has been no fever. Associated symptoms include congestion, coughing and shortness of breath. Pertinent negatives include no chills, diaphoresis, ear pain, headaches, hoarse voice, neck pain, sinus pressure, sneezing, sore throat or swollen glands. Treatments tried: albuterol,  cytomel.     Constitution: Negative for chills, sweating, fatigue and fever.   HENT:  Positive for congestion and postnasal drip. Negative for ear pain, sinus pressure and sore throat.    Neck: Negative for neck pain.   Cardiovascular:  Negative for chest pain, palpitations and sob on exertion.   Respiratory:  Positive for chest tightness, cough, shortness of breath and asthma. Negative for wheezing.    Gastrointestinal:  Negative for abdominal pain, nausea, vomiting and diarrhea.   Allergic/Immunologic: Positive for asthma. Negative for sneezing.   Neurological:  Negative for headaches.    Objective: "     Physical Exam   Constitutional: She is oriented to person, place, and time. She appears well-developed. She is cooperative.  Non-toxic appearance. She does not appear ill. No distress.   HENT:   Head: Normocephalic and atraumatic.   Ears:   Right Ear: Hearing, external ear and ear canal normal. Tympanic membrane is bulging. Tympanic membrane is not erythematous and not retracted. A middle ear effusion (clear fluid) is present.   Left Ear: Hearing, external ear and ear canal normal. Tympanic membrane is bulging. Tympanic membrane is not erythematous and not retracted. A middle ear effusion (clear fluid) is present.   Nose: Nose normal. No mucosal edema, rhinorrhea, purulent discharge or nasal deformity. No epistaxis. Right sinus exhibits no maxillary sinus tenderness and no frontal sinus tenderness. Left sinus exhibits no maxillary sinus tenderness and no frontal sinus tenderness.   Mouth/Throat: Uvula is midline, oropharynx is clear and moist and mucous membranes are normal. No trismus in the jaw. Normal dentition. No uvula swelling. No oropharyngeal exudate, posterior oropharyngeal edema or posterior oropharyngeal erythema. Tonsils are 1+ on the right. Tonsils are 1+ on the left. No tonsillar exudate.   Eyes: Conjunctivae and lids are normal. No scleral icterus.   Neck: Trachea normal and phonation normal. Neck supple. No edema present. No erythema present. No neck rigidity present.   Cardiovascular: Normal rate, regular rhythm, normal heart sounds and normal pulses.   Pulmonary/Chest: Effort normal. No accessory muscle usage or stridor. No tachypnea. No respiratory distress. She has no decreased breath sounds. She has wheezes (mild, only at end of expiration) in the right lower field and the left lower field. She has no rhonchi. She has no rales.   Abdominal: Normal appearance.   Musculoskeletal: Normal range of motion.         General: No deformity. Normal range of motion.   Lymphadenopathy:        Head  (right side): No submandibular adenopathy present.        Head (left side): No submandibular adenopathy present.     She has no cervical adenopathy.   Neurological: She is alert and oriented to person, place, and time. She exhibits normal muscle tone. Coordination normal.   Skin: Skin is warm, dry, intact, not diaphoretic and not pale.   Psychiatric: Her speech is normal and behavior is normal. Judgment and thought content normal.   Nursing note and vitals reviewed.    Results for orders placed or performed in visit on 04/04/23   SARS Coronavirus 2 Antigen, POCT Manual Read   Result Value Ref Range    SARS Coronavirus 2 Antigen Positive (A) Negative     Acceptable Yes          Assessment:     1. Exacerbation of asthma, unspecified asthma severity, unspecified whether persistent    2. Cough, unspecified type    3. COVID-19 virus infection    4. Shortness of breath        Plan:     COVID-19 Risk Score:  1    Provided education on Paxlovid and antiviral treatment, provided with printed education sheet.  Offered albuterol neb TX, pt stated that she would rather do neb TX at home.    Provided education on OTC symptomatic treatment (Claritin/Zyrtec with Flonase nasal spray for sinus congestion and cough and Tylenol/ibuprofen for pain, if needed).  Provided education on CDC recommendations for isolation/masking and for return/ER precautions, urged immediate ER visit if SOB worsens.  Pt verbalized understanding and agreed to treatment plan.      Exacerbation of asthma, unspecified asthma severity, unspecified whether persistent  -     predniSONE (DELTASONE) 20 MG tablet; Take 2 tablets (40 mg total) by mouth once daily. for 5 days  Dispense: 10 tablet; Refill: 0  -     albuterol (PROVENTIL) 2.5 mg /3 mL (0.083 %) nebulizer solution; Take 3 mLs (2.5 mg total) by nebulization every 6 (six) hours as needed for Wheezing. Rescue  Dispense: 45 mL; Refill: 0    Cough, unspecified type  -     SARS Coronavirus 2  Antigen, POCT Manual Read    COVID-19 virus infection  -     nirmatrelvir-ritonavir 300 mg (150 mg x 2)-100 mg copackaged tablets (EUA); Take 3 tablets by mouth 2 (two) times daily for 5 days. Each dose contains 2 nirmatrelvir (pink tablets) and 1 ritonavir (white tablet). Take all 3 tablets together  Dispense: 30 tablet; Refill: 0    Shortness of breath      Patient Instructions   You have tested positive for COVID-19 today.      ISOLATION  If you tested positive and do not have symptoms, you must isolate for 5 days starting on the day of the positive test.    If you tested positive and have symptoms, you must isolate for 5 days starting on the day of the first symptoms, not the day of the positive test.     This is the most important part, both the CDC and the [facility] emphasize that you do not test out of isolation.     After 5 days, if your symptoms have improved and you have not had fever on day 5, you can return to the community on day 6- NO TESTING REQUIRED!      In fact, we do not re-test if you were positive in the last 90 days.    After your 5 days of isolation are completed, the CDC recommends strict mask use for the first 5 days that you come out of isolation.    -----    You have been prescribed Paxlovid, which is an antiviral medication.     Oral antiviral treatment may help your body fight COVID-19 by stopping the SARS-CoV-2 virus (the virus that causes COVID-19) from multiplying in your body, lowering the amount of the virus within your body, or helping your immune system. By getting treatment, you could have less serious symptoms and may lower the chances of your illness getting worse and needing care in the hospital.    To be eligible for Paxlovid, you must be at least 12 years of age and weigh at least 88 pounds.    What is PAXLOVID?    PAXLOVID is an investigational medicine used to treat mild-to-moderate COVID-19 in  adults and children [12 years of age and older weighing at least 88 pounds (40 kg)]  with  positive results of direct SARS-CoV-2 viral testing, and who are at high risk for  progression to severe COVID-19, including hospitalization or death. PAXLOVID is  investigational because it is still being studied. There is limited information about the  safety and effectiveness of using PAXLOVID to treat people with mild-to-moderate  COVID-19.  The FDA has authorized the emergency use of PAXLOVID for the treatment of mild-tomoderate COVID-19 in adults and children [12 years of age and older weighing at least  88 pounds (40 kg)] with a positive test for the virus that causes COVID-19, and who are  at high risk for progression to severe COVID-19, including hospitalization or death,  under an EUA.    Tell your healthcare provider if you:  ? Have any allergies  ? Have liver or kidney disease  ? Are pregnant or plan to become pregnant  ? Are breastfeeding a child  ? Have any serious illnesses  Tell your healthcare provider about all the medicines you take, including  prescription and over-the-counter medicines, vitamins, and herbal supplements.  Some medicines may interact with PAXLOVID and may cause serious side effects.  Keep a list of your medicines to show your healthcare provider and pharmacist when  you get a new medicine.  You can ask your healthcare provider or pharmacist for a list of medicines that interact  with PAXLOVID. Do not start taking a new medicine without telling your  healthcare provider. Your healthcare provider can tell you if it is safe to take  PAXLOVID with other medicines    How do I take PAXLOVID?  ? PAXLOVID consists of 2 medicines: nirmatrelvir and ritonavir.  Take 2 pink tablets of nirmatrelvir with 1 white tablet of ritonavir by mouth  2 times each day (in the morning and in the evening) for 5 days. For each  dose, take all 3 tablets at the same time.  If you have kidney disease, talk to your healthcare provider. You  may need a different dose.  Swallow the tablets whole. Do not  chew, break, or crush the tablets.  Take PAXLOVID with or without food.  Do not stop taking PAXLOVID without talking to your healthcare provider, even if  you feel better.  If you miss a dose of PAXLOVID within 8 hours of the time it is usually taken,  take it as soon as you remember. If you miss a dose by more than 8 hours, skip  the missed dose and take the next dose at your regular time. Do not take  2 doses of PAXLOVID at the same time.  If you take too much PAXLOVID, call your healthcare provider or go to the  nearest hospital emergency room right away.  If you are taking a ritonavir- or cobicistat-containing medicine to treat hepatitis C  or Human Immunodeficiency Virus (HIV), you should continue to take your  medicine as prescribed by your healthcare provider    Possible side effects of PAXLOVID are:  ? Allergic Reactions. Allergic reactions can happen in people taking  PAXLOVID, even after only 1 dose. Stop taking PAXLOVID and call your  healthcare provider right away if you get any of the following symptoms of an  allergic reaction:  hives  trouble swallowing or breathing  swelling of the mouth, lips, or face  throat tightness  hoarseness  skin rash  ? Liver Problems. Tell your healthcare provider right away if you have any of  these signs and symptoms of liver problems: loss of appetite, yellowing of your  skin and the whites of eyes (jaundice), dark-colored urine, pale colored stools  and itchy skin, stomach area (abdominal) pain.  ? Resistance to HIV Medicines: If you have untreated HIV infection, PAXLOVID  may lead to some HIV medicines not working as well in the future.  ? Other possible side effects include:  altered sense of taste  diarrhea  high blood pressure  muscle aches  These are not all the possible side effects of PAXLOVID. Not many people have taken  PAXLOVID. Serious and unexpected side effects may happen. PAXLOVID is still being  studied, so it is possible that all of the risks are not  "known at this time.      Recent case reports document that some patients with normal immune response who have completed a 5-day course of Paxlovid for laboratory-confirmed infection and have recovered can experience recurrent illness 2 to 8 days later, including patients who have been vaccinated and/or boosted.    COVID-19 rebound is characterized by a recurrence of symptoms or a new positive viral test after having tested negative. People with COVID-19 rebound should follow CDC recommendations regarding isolation of infected patients regardless of treatment with an antiviral agent and/or previous isolation after the initial infection.     People with recurrence of COVID-19 symptoms or a new positive viral test after having tested negative should restart isolation and isolate again for at least 5 days. Per CDC guidance, they can end their re-isolation period after 5 full days if fever has resolved for 24 hours (without the use of feverreducing medication) and symptoms are improving. The individual should wear a mask for a total of 10 days after rebound symptoms started. Some people continue to test positive after day 10 but are considerably less likely to shed infectious virus.       -----    If your condition worsens or fails to improve, we recommend that you receive another evaluation at the ER immediately contact your PCP to discuss your concerns, or return here.  You must understand that you've received an urgent care treatment only, and that you may be released before all your medical problems are known or treated.  You, the patient, will arrange for follow-up care as instructed.     If we discussed that I think your illness is viral, it will not respond to antibiotics and will last 10-14 days.  If we discussed "wait and see" antibiotics, and if over the next few days the symptoms worsen, start the antibiotics I have given you.     If you are female and on birth control pills and do take the antibiotics, use " "additional methods to prevent pregnancy while on the antibiotics and for one cycle after.     Flonase (fluticasone) is a nasal spray which is available over the counter and may help with your symptoms.  Zyrtec D, Claritin D, or Allegra D can also help with symptoms of congestion and drainage.  If you have hypertension, avoid using the "D" which is the decongestant formula.    If you just have drainage, you can take plain Zyrtec, Claritin, or Allegra.  If you just have a congested feeling, you can take pseudoephedrine (unless you have high blood pressure), which you have to sign for behind the counter.  Do not buy phenylephrine OTC, as it is not effective.    Rest and fluids are also important.  Tylenol or ibuprofen can also be used as directed for pain, unless you have an allergy to them or medical condition (such as stomach ulcers, kidney or liver disease, or use blood thinners, etc.) for which you should not be taking these type of medications.     If you are flying in the next few days, Afrin nose drops for the airplane flight upon take off and landing may help.  Other than at those times, refrain from using Afrin.     If you were prescribed a narcotic or sedating cough medicine, do not drive or operate heavy machinery while taking these medications.                       "

## 2023-04-05 NOTE — PATIENT INSTRUCTIONS
You have tested positive for COVID-19 today.      ISOLATION  If you tested positive and do not have symptoms, you must isolate for 5 days starting on the day of the positive test.    If you tested positive and have symptoms, you must isolate for 5 days starting on the day of the first symptoms, not the day of the positive test.     This is the most important part, both the CDC and the [facility] emphasize that you do not test out of isolation.     After 5 days, if your symptoms have improved and you have not had fever on day 5, you can return to the community on day 6- NO TESTING REQUIRED!      In fact, we do not re-test if you were positive in the last 90 days.    After your 5 days of isolation are completed, the CDC recommends strict mask use for the first 5 days that you come out of isolation.    -----    You have been prescribed Paxlovid, which is an antiviral medication.     Oral antiviral treatment may help your body fight COVID-19 by stopping the SARS-CoV-2 virus (the virus that causes COVID-19) from multiplying in your body, lowering the amount of the virus within your body, or helping your immune system. By getting treatment, you could have less serious symptoms and may lower the chances of your illness getting worse and needing care in the hospital.    To be eligible for Paxlovid, you must be at least 12 years of age and weigh at least 88 pounds.    What is PAXLOVID?    PAXLOVID is an investigational medicine used to treat mild-to-moderate COVID-19 in  adults and children [12 years of age and older weighing at least 88 pounds (40 kg)] with  positive results of direct SARS-CoV-2 viral testing, and who are at high risk for  progression to severe COVID-19, including hospitalization or death. PAXLOVID is  investigational because it is still being studied. There is limited information about the  safety and effectiveness of using PAXLOVID to treat people with mild-to-moderate  COVID-19.  The FDA has authorized the  emergency use of PAXLOVID for the treatment of mild-tomoderate COVID-19 in adults and children [12 years of age and older weighing at least  88 pounds (40 kg)] with a positive test for the virus that causes COVID-19, and who are  at high risk for progression to severe COVID-19, including hospitalization or death,  under an EUA.    Tell your healthcare provider if you:  ? Have any allergies  ? Have liver or kidney disease  ? Are pregnant or plan to become pregnant  ? Are breastfeeding a child  ? Have any serious illnesses  Tell your healthcare provider about all the medicines you take, including  prescription and over-the-counter medicines, vitamins, and herbal supplements.  Some medicines may interact with PAXLOVID and may cause serious side effects.  Keep a list of your medicines to show your healthcare provider and pharmacist when  you get a new medicine.  You can ask your healthcare provider or pharmacist for a list of medicines that interact  with PAXLOVID. Do not start taking a new medicine without telling your  healthcare provider. Your healthcare provider can tell you if it is safe to take  PAXLOVID with other medicines    How do I take PAXLOVID?  ? PAXLOVID consists of 2 medicines: nirmatrelvir and ritonavir.  Take 2 pink tablets of nirmatrelvir with 1 white tablet of ritonavir by mouth  2 times each day (in the morning and in the evening) for 5 days. For each  dose, take all 3 tablets at the same time.  If you have kidney disease, talk to your healthcare provider. You  may need a different dose.  Swallow the tablets whole. Do not chew, break, or crush the tablets.  Take PAXLOVID with or without food.  Do not stop taking PAXLOVID without talking to your healthcare provider, even if  you feel better.  If you miss a dose of PAXLOVID within 8 hours of the time it is usually taken,  take it as soon as you remember. If you miss a dose by more than 8 hours, skip  the missed dose and take the next dose at your  regular time. Do not take  2 doses of PAXLOVID at the same time.  If you take too much PAXLOVID, call your healthcare provider or go to the  nearest hospital emergency room right away.  If you are taking a ritonavir- or cobicistat-containing medicine to treat hepatitis C  or Human Immunodeficiency Virus (HIV), you should continue to take your  medicine as prescribed by your healthcare provider    Possible side effects of PAXLOVID are:  ? Allergic Reactions. Allergic reactions can happen in people taking  PAXLOVID, even after only 1 dose. Stop taking PAXLOVID and call your  healthcare provider right away if you get any of the following symptoms of an  allergic reaction:  hives  trouble swallowing or breathing  swelling of the mouth, lips, or face  throat tightness  hoarseness  skin rash  ? Liver Problems. Tell your healthcare provider right away if you have any of  these signs and symptoms of liver problems: loss of appetite, yellowing of your  skin and the whites of eyes (jaundice), dark-colored urine, pale colored stools  and itchy skin, stomach area (abdominal) pain.  ? Resistance to HIV Medicines: If you have untreated HIV infection, PAXLOVID  may lead to some HIV medicines not working as well in the future.  ? Other possible side effects include:  altered sense of taste  diarrhea  high blood pressure  muscle aches  These are not all the possible side effects of PAXLOVID. Not many people have taken  PAXLOVID. Serious and unexpected side effects may happen. PAXLOVID is still being  studied, so it is possible that all of the risks are not known at this time.      Recent case reports document that some patients with normal immune response who have completed a 5-day course of Paxlovid for laboratory-confirmed infection and have recovered can experience recurrent illness 2 to 8 days later, including patients who have been vaccinated and/or boosted.    COVID-19 rebound is characterized by a recurrence of symptoms or a  "new positive viral test after having tested negative. People with COVID-19 rebound should follow CDC recommendations regarding isolation of infected patients regardless of treatment with an antiviral agent and/or previous isolation after the initial infection.     People with recurrence of COVID-19 symptoms or a new positive viral test after having tested negative should restart isolation and isolate again for at least 5 days. Per CDC guidance, they can end their re-isolation period after 5 full days if fever has resolved for 24 hours (without the use of feverreducing medication) and symptoms are improving. The individual should wear a mask for a total of 10 days after rebound symptoms started. Some people continue to test positive after day 10 but are considerably less likely to shed infectious virus.       -----    If your condition worsens or fails to improve, we recommend that you receive another evaluation at the ER immediately contact your PCP to discuss your concerns, or return here.  You must understand that you've received an urgent care treatment only, and that you may be released before all your medical problems are known or treated.  You, the patient, will arrange for follow-up care as instructed.     If we discussed that I think your illness is viral, it will not respond to antibiotics and will last 10-14 days.  If we discussed "wait and see" antibiotics, and if over the next few days the symptoms worsen, start the antibiotics I have given you.     If you are female and on birth control pills and do take the antibiotics, use additional methods to prevent pregnancy while on the antibiotics and for one cycle after.     Flonase (fluticasone) is a nasal spray which is available over the counter and may help with your symptoms.  Zyrtec D, Claritin D, or Allegra D can also help with symptoms of congestion and drainage.  If you have hypertension, avoid using the "D" which is the decongestant formula.    If you " just have drainage, you can take plain Zyrtec, Claritin, or Allegra.  If you just have a congested feeling, you can take pseudoephedrine (unless you have high blood pressure), which you have to sign for behind the counter.  Do not buy phenylephrine OTC, as it is not effective.    Rest and fluids are also important.  Tylenol or ibuprofen can also be used as directed for pain, unless you have an allergy to them or medical condition (such as stomach ulcers, kidney or liver disease, or use blood thinners, etc.) for which you should not be taking these type of medications.     If you are flying in the next few days, Afrin nose drops for the airplane flight upon take off and landing may help.  Other than at those times, refrain from using Afrin.     If you were prescribed a narcotic or sedating cough medicine, do not drive or operate heavy machinery while taking these medications.

## 2023-04-09 NOTE — PROGRESS NOTES
"[facility] Note    Chief Complaint    No chief complaint on file.      History of Present Illness      Tami Ludwig is a 42 y.o. WF presents to fu chronic issues and transaminitis.  Last visit - 5/27/20    The patient location is: Home  The chief complaint leading to consultation is: "Fu chronic issues"    Visit type: audiovisual    Face to Face time with patient: 34 minutes  34 minutes of total time spent on the encounter, which includes face to face time and non-face to face time preparing to see the patient (eg, review of tests), Obtaining and/or reviewing separately obtained history, Documenting clinical information in the electronic or other health record, Independently interpreting results (not separately reported) and communicating results to the patient/family/caregiver, or Care coordination (not separately reported).         Each patient to whom he or she provides medical services by telemedicine is:  (1) informed of the relationship between the physician and patient and the respective role of any other health care provider with respect to management of the patient; and (2) notified that he or she may decline to receive medical services by telemedicine and may withdraw from such care at any time.    Notes:     Note - she was started on Liothyronine and TFT's were wnl at [facility]. I have instructed her to stop this. We discussed dangers of overtreating the thyroid incl bone loss.     BMI - ? - Pt on Semaglutide 0.25 mg/d and B12 started at [facility] 2 wks ago. We discussed and I don't rec this for pt at this time.     Hypereosinophilia- 700. Will cont to monitor. Note - was in [place] in June 2022.  Can repeat at next visit.     COVID 19 - Tx with Paxlovid -  4/4/23.     Transaminitis  - Lab review - 3/9/23 - AST - 71 . Will repeat LFT's.  Check Acute hepatitis panel. Check RUQ U/S.     Asthma - Pt on Albuterol prn and Advair BID.  Fall and winter and allergies " trigger her. Doing well until recent COVID.     GERD - Rare. She avoids triggers.     +RAEANN - Will perform lupus panel in future. Prev CCP and Rheumatoid factor - were both negative.  Note - fam h/o RA and Hashimoto's.    Microscopic colitis - Tx with budesonide. Doing great.      Recurrent infections - Will cont to monitor and consider Davila if needed. Tx with amoxil 2/28/22; Tx with Zmax 12/26/21; Tx for LLL Pna - Augmentin and doxy 11/30/22     HCM - Flu - 11/2019;  Tdap - '18?;   MGM - 3/16/23- repeat 1 yr;  PAP - 9/19 - wnl;   HIV Screen - utd?; C-scope - 7/12/21 - microscopic colitis; Prev PCP - Dr. Jean Carlos Medrano;  Ob/GYN - Dr. Kidd; Derm - Dr. Mer Go; Rheum - Dr. Galan;     Patient Care Team:  Sally Corley MD as PCP - General (Internal Medicine)  Sherrill Ponce MA as Care Coordinator     Health Maintenance:  Immunization History   Administered Date(s) Administered    COVID-19, MRNA, LN-S, PF (Pfizer) (Purple Cap) 02/26/2021, 03/22/2021, 11/18/2021    HPV Quadrivalent 01/22/2009, 03/26/2009    Influenza - Quadrivalent - MDCK - PF 01/27/2018    Influenza - Trivalent - PF (ADULT) 10/27/2016    Tdap 08/14/2018      Health Maintenance   Topic Date Due    Hepatitis C Screening  Never done    Mammogram  03/16/2024    Colonoscopy  07/12/2028    TETANUS VACCINE  08/14/2028    Lipid Panel  Completed        Past Medical History:  Past Medical History:   Diagnosis Date    Asthma     Chronic diarrhea     Microscopic colitis     Pneumonia, unspecified organism 11/30/2022       Past Surgical History:   has a past surgical history that includes Augmentation of breast; Appendectomy; Colonoscopy (N/A, 07/12/2021); and Breast surgery (December 2006).    Family History:  family history includes Arthritis in her sister; Cancer (age of onset: 66) in her father; Crohn's disease in her father; Hashimoto's thyroiditis in her sister; Hypertension in her father and mother; Kidney disease in her maternal  grandmother; Raynaud syndrome in her sister; Rheum arthritis in her father, paternal grandfather, and sister; Stroke in her paternal grandfather; Thyroid disease in her sister.     Social History:  Social History     Tobacco Use    Smoking status: Never     Passive exposure: Never    Smokeless tobacco: Never   Substance Use Topics    Alcohol use: Yes     Alcohol/week: 2.0 standard drinks     Types: 2 Drinks containing 0.5 oz of alcohol per week    Drug use: Never       Review of Systems   Constitutional:  Negative for activity change and unexpected weight change.   HENT:  Negative for hearing loss, rhinorrhea and trouble swallowing.    Eyes:  Negative for discharge and visual disturbance.   Respiratory:  Negative for chest tightness and wheezing.    Cardiovascular:  Negative for chest pain and palpitations.   Gastrointestinal:  Positive for constipation. Negative for blood in stool, diarrhea and vomiting.   Endocrine: Negative for polydipsia and polyuria.   Genitourinary:  Negative for difficulty urinating, dysuria, hematuria and menstrual problem.   Musculoskeletal:  Negative for arthralgias, joint swelling and neck pain.   Neurological:  Negative for weakness and headaches.   Psychiatric/Behavioral:  Negative for confusion and dysphoric mood.       Medications:    Current Outpatient Medications:     albuterol (PROVENTIL) 2.5 mg /3 mL (0.083 %) nebulizer solution, Take 3 mLs (2.5 mg total) by nebulization every 6 (six) hours as needed for Wheezing. Rescue, Disp: 45 mL, Rfl: 0    cetirizine (ZYRTEC) 10 MG tablet, Take 10 mg by mouth once daily., Disp: , Rfl:     fluticasone propion-salmeterol 115-21 mcg/dose (ADVAIR HFA) 115-21 mcg/actuation HFAA inhaler, Inhale 2 puffs into the lungs every 12 (twelve) hours. Controller, Disp: 12 g, Rfl: 6    liothyronine (CYTOMEL) 50 MCG Tab, Take 25 mcg by mouth once daily., Disp: , Rfl:      Allergies:  Review of patient's allergies indicates:  No Known Allergies    Physical Exam                     Physical Exam  Constitutional:       General: She is not in acute distress.     Appearance: Normal appearance. She is not ill-appearing, toxic-appearing or diaphoretic.   HENT:      Head: Normocephalic and atraumatic.   Pulmonary:      Effort: No respiratory distress.   Neurological:      General: No focal deficit present.      Mental Status: She is alert and oriented to person, place, and time.   Psychiatric:         Mood and Affect: Mood normal.         Behavior: Behavior normal.        Laboratory:  CBC:  Recent Labs   Lab 05/27/20  1005 05/31/21  1136 07/27/22  1019   WBC 6.98 8.34 8.07   RBC 4.44 4.57 4.71   Hemoglobin 13.4 13.8 14.3   Hematocrit 43.2 42.0 43.6   Platelets 335 313 408   MCV 97 92 93   MCH 30.2 30.2 30.4   MCHC 31.0 L 32.9 32.8       CMP:  Recent Labs   Lab 05/27/20  1005 05/31/21  1136 07/27/22  1019   Glucose 88 91 93   Calcium 9.3 9.4 9.8   Albumin 4.3 4.3 4.3   Total Protein 7.4 7.3 7.4   Sodium 138 140 137   Potassium 4.0 4.2 4.2   CO2 24 24 27   Chloride 106 107 103   BUN 8 10 10   Creatinine 0.8 0.8 0.8   Alkaline Phosphatase 63 59 61   ALT 15 12 74 H   AST 17 16 41 H   Total Bilirubin 0.6 0.6 0.6       LIPIDS:  Recent Labs   Lab 05/27/20  1005 05/31/21  1136 07/27/22  1019   TSH 0.924 1.485 1.526   HDL 61 61 78 H   Cholesterol 164 151 193   Triglycerides 53 40 61   LDL Cholesterol 92.4 82.0 102.8   HDL/Cholesterol Ratio 37.2 40.4 40.4   Non-HDL Cholesterol 103 90 115   Total Cholesterol/HDL Ratio 2.7 2.5 2.5       TSH:  Recent Labs   Lab 05/27/20  1005 05/31/21  1136 07/27/22  1019   TSH 0.924 1.485 1.526       A1C:  Recent Labs   Lab 07/27/22  1019   Hemoglobin A1C 4.9       Assessment/Plan     Tami Ludwig is a 42 y.o.female with:    Transaminitis  -     Hepatic Function Panel; Future; Expected date: 04/24/2023  -     Hepatitis Panel, Acute; Future; Expected date: 04/24/2023  -     US Abdomen Limited; Future; Expected date: 04/10/2023  - Will repeat Lft's in 2  wks and check an acute hep panel.  Will check RUQ U/S.     RAEANN positive  - Will check a Lupus panel at next visit.     Asthma, unspecified asthma severity, unspecified whether complicated, unspecified whether persistent  - Stable.  Cont current regimen. Pt getting over recent COVID infection. She has completed quarantine.     Gastroesophageal reflux disease, unspecified whether esophagitis present  - Stable.  Cont current regimen.    Eosinophilia, unspecified type  - Will repeat at next visit.        Chronic conditions status updated as per HPI.  Other than changes above, cont current medications and maintain follow up with specialists.   Fu in 2-3 mos.    Sally Corley MD  [facility]

## 2023-04-10 ENCOUNTER — OFFICE VISIT (OUTPATIENT)
Dept: PRIMARY CARE CLINIC | Facility: CLINIC | Age: 43
End: 2023-04-10
Payer: COMMERCIAL

## 2023-04-10 DIAGNOSIS — K21.9 GASTROESOPHAGEAL REFLUX DISEASE, UNSPECIFIED WHETHER ESOPHAGITIS PRESENT: ICD-10-CM

## 2023-04-10 DIAGNOSIS — D72.10 EOSINOPHILIA, UNSPECIFIED TYPE: ICD-10-CM

## 2023-04-10 DIAGNOSIS — R74.01 TRANSAMINITIS: Primary | ICD-10-CM

## 2023-04-10 DIAGNOSIS — R76.8 ANA POSITIVE: ICD-10-CM

## 2023-04-10 DIAGNOSIS — J45.909 ASTHMA, UNSPECIFIED ASTHMA SEVERITY, UNSPECIFIED WHETHER COMPLICATED, UNSPECIFIED WHETHER PERSISTENT: ICD-10-CM

## 2023-04-10 PROCEDURE — 1159F MED LIST DOCD IN RCRD: CPT | Mod: CPTII,95,, | Performed by: INTERNAL MEDICINE

## 2023-04-10 PROCEDURE — 99214 OFFICE O/P EST MOD 30 MIN: CPT | Mod: 95,,, | Performed by: INTERNAL MEDICINE

## 2023-04-10 PROCEDURE — 1160F RVW MEDS BY RX/DR IN RCRD: CPT | Mod: CPTII,95,, | Performed by: INTERNAL MEDICINE

## 2023-04-10 PROCEDURE — 1159F PR MEDICATION LIST DOCUMENTED IN MEDICAL RECORD: ICD-10-PCS | Mod: CPTII,95,, | Performed by: INTERNAL MEDICINE

## 2023-04-10 PROCEDURE — 1160F PR REVIEW ALL MEDS BY PRESCRIBER/CLIN PHARMACIST DOCUMENTED: ICD-10-PCS | Mod: CPTII,95,, | Performed by: INTERNAL MEDICINE

## 2023-04-10 PROCEDURE — 99214 PR OFFICE/OUTPT VISIT, EST, LEVL IV, 30-39 MIN: ICD-10-PCS | Mod: 95,,, | Performed by: INTERNAL MEDICINE

## 2023-04-11 ENCOUNTER — TELEPHONE (OUTPATIENT)
Dept: PRIMARY CARE CLINIC | Facility: CLINIC | Age: 43
End: 2023-04-11
Payer: COMMERCIAL

## 2023-04-11 NOTE — TELEPHONE ENCOUNTER
Benitom requesting a call back to schedule her f/u appt,abd u/s and non fasting labs that were ordered by  during the virtual visit yesterday.

## 2023-04-14 ENCOUNTER — HOSPITAL ENCOUNTER (OUTPATIENT)
Dept: RADIOLOGY | Facility: HOSPITAL | Age: 43
Discharge: HOME | End: 2023-04-14
Attending: INTERNAL MEDICINE
Payer: COMMERCIAL

## 2023-04-14 DIAGNOSIS — R74.01 TRANSAMINITIS: ICD-10-CM

## 2023-04-14 PROCEDURE — 76705 ECHO EXAM OF ABDOMEN: CPT | Mod: 26,,, | Performed by: STUDENT IN AN ORGANIZED HEALTH CARE EDUCATION/TRAINING PROGRAM

## 2023-04-14 PROCEDURE — 76705 US ABDOMEN LIMITED: ICD-10-PCS | Mod: 26,,, | Performed by: STUDENT IN AN ORGANIZED HEALTH CARE EDUCATION/TRAINING PROGRAM

## 2023-04-14 PROCEDURE — 76705 ECHO EXAM OF ABDOMEN: CPT | Mod: TC

## 2023-04-24 ENCOUNTER — LAB VISIT (OUTPATIENT)
Dept: LAB | Facility: HOSPITAL | Age: 43
End: 2023-04-24
Attending: INTERNAL MEDICINE
Payer: COMMERCIAL

## 2023-04-24 DIAGNOSIS — R74.01 TRANSAMINITIS: ICD-10-CM

## 2023-04-24 LAB
ALBUMIN SERPL BCP-MCNC: 4.4 G/DL (ref 3.5–5.2)
ALP SERPL-CCNC: 59 U/L (ref 55–135)
ALT SERPL W/O P-5'-P-CCNC: 32 U/L (ref 10–44)
AST SERPL-CCNC: 24 U/L (ref 10–40)
BILIRUB DIRECT SERPL-MCNC: 0.2 MG/DL (ref 0.1–0.3)
BILIRUB SERPL-MCNC: 0.6 MG/DL (ref 0.1–1)
HAV IGM SERPL QL IA: NORMAL
HBV CORE IGM SERPL QL IA: NORMAL
HBV SURFACE AG SERPL QL IA: NORMAL
HCV AB SERPL QL IA: NORMAL
PROT SERPL-MCNC: 7.4 G/DL (ref 6–8.4)

## 2023-04-24 PROCEDURE — 80076 HEPATIC FUNCTION PANEL: CPT | Performed by: INTERNAL MEDICINE

## 2023-04-24 PROCEDURE — 80074 ACUTE HEPATITIS PANEL: CPT | Performed by: INTERNAL MEDICINE

## 2023-04-24 PROCEDURE — 36415 COLL VENOUS BLD VENIPUNCTURE: CPT | Performed by: INTERNAL MEDICINE

## 2023-04-25 NOTE — PROGRESS NOTES
I sent pt a my chart message -  I reviewed your labs.  Your acute hepatitis panel was negative and your liver enzymes were back to normal.  No further interventions are necessary at this time.   Dr. HAM

## 2023-08-26 ENCOUNTER — PATIENT MESSAGE (OUTPATIENT)
Dept: PRIMARY CARE CLINIC | Facility: CLINIC | Age: 43
End: 2023-08-26
Payer: COMMERCIAL

## 2023-08-26 DIAGNOSIS — J45.901 EXACERBATION OF ASTHMA, UNSPECIFIED ASTHMA SEVERITY, UNSPECIFIED WHETHER PERSISTENT: ICD-10-CM

## 2023-08-26 DIAGNOSIS — J45.909 ASTHMA, UNSPECIFIED ASTHMA SEVERITY, UNSPECIFIED WHETHER COMPLICATED, UNSPECIFIED WHETHER PERSISTENT: ICD-10-CM

## 2023-08-28 RX ORDER — FLUTICASONE PROPIONATE AND SALMETEROL XINAFOATE 115; 21 UG/1; UG/1
2 AEROSOL, METERED RESPIRATORY (INHALATION) EVERY 12 HOURS
Qty: 12 G | Refills: 1 | Status: SHIPPED | OUTPATIENT
Start: 2023-08-28 | End: 2023-10-03 | Stop reason: SDUPTHER

## 2023-08-28 RX ORDER — ALBUTEROL SULFATE 90 UG/1
2 AEROSOL, METERED RESPIRATORY (INHALATION) EVERY 6 HOURS PRN
Qty: 18 G | Refills: 0 | Status: SHIPPED | OUTPATIENT
Start: 2023-08-28 | End: 2023-09-24

## 2023-08-28 NOTE — TELEPHONE ENCOUNTER
No care due was identified.  Health [facility] Embedded Care Due Messages. Reference number: [id number].   8/28/2023 8:12:34 AM CDT

## 2023-08-29 ENCOUNTER — OFFICE VISIT (OUTPATIENT)
Dept: INTERNAL MEDICINE | Facility: CLINIC | Age: 43
End: 2023-08-29
Payer: COMMERCIAL

## 2023-08-29 DIAGNOSIS — R05.2 SUBACUTE COUGH: Primary | ICD-10-CM

## 2023-08-29 PROCEDURE — 99214 PR OFFICE/OUTPT VISIT, EST, LEVL IV, 30-39 MIN: ICD-10-PCS | Mod: 95,,, | Performed by: STUDENT IN AN ORGANIZED HEALTH CARE EDUCATION/TRAINING PROGRAM

## 2023-08-29 PROCEDURE — 99214 OFFICE O/P EST MOD 30 MIN: CPT | Mod: 95,,, | Performed by: STUDENT IN AN ORGANIZED HEALTH CARE EDUCATION/TRAINING PROGRAM

## 2023-08-29 RX ORDER — PREDNISONE 20 MG/1
20 TABLET ORAL 2 TIMES DAILY
Qty: 8 TABLET | Refills: 0 | Status: SHIPPED | OUTPATIENT
Start: 2023-08-29 | End: 2023-09-02

## 2023-08-29 NOTE — PROGRESS NOTES
Subjective:      Chief Complaint: COUGH       The patient location is:  patient's home  The chief complaint leading to consultation is: COUGH   Visit type: Virtual visit with synchronous audio and video  Total time spent with patient: 15 mins   Each patient to whom he or she provides medical services by telemedicine is:  (1) informed of the relationship between the physician and patient and the respective role of any other health care provider with respect to management of the patient; and (2) notified that he or she may decline to receive medical services by telemedicine and may withdraw from such care at any time.  Patient agreed to this telemedicine visit today in an effort to avoid face-to-face visits due to the current COVID 19 pandemic.    Ms.Kelly Ludwig is a 42-year-old woman, new to me but established with a colleague with history of asthma (asthmatic bronchitis), seasonal allergies, and community-acquired pneumonia (treated for LLL pneumonia 11/22) presenting via telemedicine to discuss coughing and wheezing:    Coughing/wheezing:   -between 1-4 weeks (about 2); semi-productive (phlegm) and now with associated wheezing (amenable to rescue inhaler use)  -1 recent/ill contact (son diagnosed with unspecified bronchitis and treat empirically with oral antibiotics via pediatrician)  - denies F/C or N/V   - of note, briefly establish with rheumatology 1/22 secondary to positive RAEANN in setting of significant family history (father/sister with rheumatoid arthritis etc.).  More specific evaluation defer that time due to recent COVID diagnosis (concern for fictitious systemic inflammatory marker elevation), and was subsequently lost to 1 year follow-up.    Review of Systems   Constitutional:  Negative for appetite change, chills and fever.   HENT: Negative.     Respiratory:  Positive for cough and wheezing. Negative for chest tightness and shortness of breath.    Cardiovascular:  Negative for chest pain, palpitations  and leg swelling.   Gastrointestinal:  Negative for abdominal distention, abdominal pain, blood in stool, constipation, diarrhea, nausea and vomiting.   Endocrine: Negative.    Genitourinary:  Negative for difficulty urinating, dysuria, frequency and hematuria.   Musculoskeletal: Negative.    Integumentary:  Negative.   Neurological: Negative.    Psychiatric/Behavioral: Negative.           Objective:      There were no vitals filed for this visit.   Physical Exam  Vitals reviewed: Unable to obtain given constraints of telemedicine.       Current Outpatient Medications on File Prior to Visit   Medication Sig Dispense Refill    albuterol (PROVENTIL) 2.5 mg /3 mL (0.083 %) nebulizer solution Take 3 mLs (2.5 mg total) by nebulization every 6 (six) hours as needed for Wheezing. Rescue 45 mL 0    albuterol (PROVENTIL/VENTOLIN HFA) 90 mcg/actuation inhaler Inhale 2 puffs into the lungs every 6 (six) hours as needed for Wheezing. Rescue 18 g 0    cetirizine (ZYRTEC) 10 MG tablet Take 10 mg by mouth once daily.      fluticasone propion-salmeterol 115-21 mcg/dose (ADVAIR HFA) 115-21 mcg/actuation HFAA inhaler Inhale 2 puffs into the lungs every 12 (twelve) hours. Controller 12 g 1     No current facility-administered medications on file prior to visit.         Assessment:       1. Subacute cough        Plan:       Subacute cough   - given patient's history, active wheezing non ill appearance, and response to rescue inhaler use asthmatic bronchitis features most prominently on differential for which oral course of high-intensity steroids were called in.     - precautions discussed regarding need for more formal/in-person evaluation if symptoms worsen or any new constitutional symptoms present themselves.      Other orders  -     predniSONE (DELTASONE) 20 MG tablet; Take 1 tablet (20 mg total) by mouth 2 (two) times daily. for 4 days  Dispense: 8 tablet; Refill: 0

## 2023-09-22 NOTE — TELEPHONE ENCOUNTER
No care due was identified.  Health [facility] Embedded Care Due Messages. Reference number: [id number].   9/22/2023 3:04:52 PM CDT

## 2023-09-24 RX ORDER — ALBUTEROL SULFATE 90 UG/1
2 AEROSOL, METERED RESPIRATORY (INHALATION) EVERY 6 HOURS PRN
Qty: 6.7 G | Refills: 1 | Status: SHIPPED | OUTPATIENT
Start: 2023-09-24

## 2023-10-01 NOTE — PROGRESS NOTES
[facility] Note    Chief Complaint      Chief Complaint   Patient presents with    Annual Exam       History of Present Illness      Tami Ludwig is a 42 y.o.  WF presents to fu chronic issues and transaminitis.  Last visit - 4/10/23      Note - she was prev. started on Liothyronine and TFT's were wnl at [facility]. I had instructed her to stop this which she did. We discussed dangers of overtreating the thyroid incl bone loss.     Eosinophilia- 700 - 7/27/22. Suspect likely due to allergies.  Will cont to monitor. Note - was in [place] in June 2022.       H/o Transaminitis  - Resolved. acute hepatitis panel was negative and your liver enzymes were back to normal. Abd U/S - 4/14/23 - wnl     Asthma - Pt on Albuterol prn and Advair QAM.  Fall and winter and allergies trigger her.  will resume montelukast.      GERD - Rare. She avoids triggers.      +RAEANN - Will perform lupus panel in future. Prev CCP and Rheumatoid factor - were both negative.  Note - fam h/o RA and Hashimoto's.     Microscopic colitis - Tx with budesonide. Doing great.      Recurrent infections - Will cont to monitor and consider Davila if needed. Tx with amoxil 2/28/22; Tx with Zmax 12/26/21; Tx for LLL Pna - Augmentin and doxy 11/30/22     HCM - Flu - admin 10/3/23;  Tdap - 8/14/18;   MGM - 3/16/23- repeat 1 yr; at [facility];   PAP - 10/12/22- wnl - wnl;   HIV Screen - utd?; C-scope - 7/12/21 - microscopic colitis; Prev PCP - Dr. Jean Carlos Medrano;  Ob/GYN - Dr. Kidd; Derm - Dr. Mer Rodriguez Go; Rheum - Dr. Galan;     Patient Care Team:  Sally Corley MD as PCP - General (Internal Medicine)  Sherrill Ponce MA as Care Coordinator     Health Maintenance:  Immunization History   Administered Date(s) Administered    COVID-19, MRNA, LN-S, PF (Pfizer) (Purple Cap) 02/26/2021, 03/22/2021, 11/18/2021    HPV Quadrivalent 01/22/2009, 03/26/2009    Influenza - Quadrivalent - MDCK - PF 01/27/2018    Influenza -  Quadrivalent - PF *Preferred* (6 months and older) 10/27/2016    Influenza - Trivalent - PF (ADULT) 10/27/2016    Tdap 08/14/2018      Health Maintenance   Topic Date Due    Mammogram  03/16/2024    Colonoscopy  07/12/2028    TETANUS VACCINE  08/14/2028    Hepatitis C Screening  Completed    Lipid Panel  Completed        Past Medical History:  Past Medical History:   Diagnosis Date    Asthma     Chronic diarrhea     COVID-19 04/04/2023    Microscopic colitis     Pneumonia, unspecified organism 11/30/2022       Past Surgical History:   has a past surgical history that includes Augmentation of breast; Appendectomy; Colonoscopy (N/A, 07/12/2021); and Breast surgery (December 2006).    Family History:  family history includes Cancer (age of onset: 66) in her father; Crohn's disease in her father; Hashimoto's thyroiditis in her sister; Hypertension in her father and mother; Kidney disease in her maternal grandmother; Raynaud syndrome in her sister; Rheum arthritis in her father, paternal grandfather, and sister; Stroke in her paternal grandfather; Thyroid disease in her sister.     Social History:  Social History     Tobacco Use    Smoking status: Never     Passive exposure: Never    Smokeless tobacco: Never   Substance Use Topics    Alcohol use: Yes     Alcohol/week: 2.0 standard drinks of alcohol     Types: 2 Drinks containing 0.5 oz of alcohol per week    Drug use: Never       Review of Systems   Constitutional:  Negative for chills, diaphoresis and fever.   HENT:  Positive for postnasal drip. Negative for hearing loss.    Eyes:  Negative for visual disturbance.   Respiratory:  Positive for chest tightness and wheezing. Negative for cough and shortness of breath.         2 episode last wk.  One of which awakened her. Worse with deep inspiration for 1steoisode. 2nd epsiode more steady pain that lasted most of the day. No assoc SOB.    Cardiovascular:  Negative for palpitations and leg swelling.   Gastrointestinal:   "Negative for abdominal pain, constipation, diarrhea, nausea, vomiting and reflux.   Endocrine: Negative for cold intolerance, heat intolerance and polydipsia.   Genitourinary:  Negative for bladder incontinence, dysuria and frequency.   Musculoskeletal:  Negative for arthralgias and myalgias.   Neurological:  Negative for dizziness.   Psychiatric/Behavioral:  Negative for dysphoric mood. The patient is not nervous/anxious.         Medications:    Current Outpatient Medications:     albuterol (PROVENTIL) 2.5 mg /3 mL (0.083 %) nebulizer solution, Take 3 mLs (2.5 mg total) by nebulization every 6 (six) hours as needed for Wheezing. Rescue, Disp: 45 mL, Rfl: 0    albuterol (PROVENTIL/VENTOLIN HFA) 90 mcg/actuation inhaler, INHALE 2 PUFFS INTO THE LUNGS EVERY 6 HOURS AS NEEDED FOR WHEEZING. RESCUE, Disp: 6.7 g, Rfl: 1    cetirizine (ZYRTEC) 10 MG tablet, Take 10 mg by mouth once daily., Disp: , Rfl:     fluticasone propion-salmeterol 115-21 mcg/dose (ADVAIR HFA) 115-21 mcg/actuation HFAA inhaler, Inhale 2 puffs into the lungs every 12 (twelve) hours. Controller, Disp: 12 g, Rfl: 11    fluticasone propionate (FLONASE) 50 mcg/actuation nasal spray, 1 spray (50 mcg total) by Each Nostril route once daily., Disp: 18.2 mL, Rfl: 3    montelukast (SINGULAIR) 10 mg tablet, Take 1 tablet (10 mg total) by mouth every evening., Disp: 30 tablet, Rfl: 3     Allergies:  Review of patient's allergies indicates:  No Known Allergies    Physical Exam      Vital Signs  Temp: 98.3 °F (36.8 °C)  Temp Source: Oral  Pulse: 86  Resp: 16  SpO2: 99 %  BP: 104/62  BP Location: Right arm  Patient Position: Sitting  Pain Score: 0-No pain  Height and Weight  Height: 5' 6" (167.6 cm)  Weight: 66.4 kg (146 lb 6.2 oz)  BSA (Calculated - sq m): 1.76 sq meters  BMI (Calculated): 23.6  Weight in (lb) to have BMI = 25: 154.6      Patient Position: Sitting      Physical Exam  Vitals reviewed.   Constitutional:       General: She is not in acute distress.    "  Appearance: Normal appearance. She is not ill-appearing, toxic-appearing or diaphoretic.   HENT:      Head: Normocephalic and atraumatic.      Right Ear: Tympanic membrane normal.      Left Ear: Tympanic membrane normal.      Nose:      Comments: ? Nasal polyp on Rt     Mouth/Throat:      Mouth: Mucous membranes are moist.   Eyes:      Extraocular Movements: Extraocular movements intact.      Conjunctiva/sclera: Conjunctivae normal.      Pupils: Pupils are equal, round, and reactive to light.   Neck:      Vascular: No carotid bruit.   Cardiovascular:      Rate and Rhythm: Normal rate and regular rhythm.      Pulses: Normal pulses.      Heart sounds: Normal heart sounds. No murmur heard.  Pulmonary:      Effort: No respiratory distress.      Breath sounds: Normal breath sounds. No wheezing.   Abdominal:      General: Bowel sounds are normal. There is no distension.      Palpations: Abdomen is soft.      Tenderness: There is no abdominal tenderness. There is no guarding or rebound.   Neurological:      General: No focal deficit present.      Mental Status: She is alert and oriented to person, place, and time.   Psychiatric:         Mood and Affect: Mood normal.         Behavior: Behavior normal.          Laboratory:  CBC:  Recent Labs   Lab 05/31/21 1136 07/27/22  1019   WBC 8.34 8.07   RBC 4.57 4.71   Hemoglobin 13.8 14.3   Hematocrit 42.0 43.6   Platelets 313 408   MCV 92 93   MCH 30.2 30.4   MCHC 32.9 32.8       CMP:  Recent Labs   Lab 05/31/21 1136 07/27/22  1019 04/24/23  1154   Glucose 91 93  --    Calcium 9.4 9.8  --    Albumin 4.3 4.3 4.4   Total Protein 7.3 7.4 7.4   Sodium 140 137  --    Potassium 4.2 4.2  --    CO2 24 27  --    Chloride 107 103  --    BUN 10 10  --    Creatinine 0.8 0.8  --    Alkaline Phosphatase 59 61 59   ALT 12 74 H 32   AST 16 41 H 24   Total Bilirubin 0.6 0.6 0.6         LIPIDS:  Recent Labs   Lab 05/31/21 1136 07/27/22  1019   TSH 1.485 1.526   HDL 61 78 H   Cholesterol 151 193    Triglycerides 40 61   LDL Cholesterol 82.0 102.8   HDL/Cholesterol Ratio 40.4 40.4   Non-HDL Cholesterol 90 115   Total Cholesterol/HDL Ratio 2.5 2.5       TSH:  Recent Labs   Lab 05/31/21  1136 07/27/22  1019   TSH 1.485 1.526       A1C:  Recent Labs   Lab 07/27/22  1019   Hemoglobin A1C 4.9         Recent Labs   Lab 04/24/23  1154   Hepatitis C Ab Non-reactive       Assessment/Plan     Tami Ludwig is a 42 y.o.female with:    Normal physical exam, routine  -     CBC Auto Differential; Future; Expected date: 10/03/2023  -     Comprehensive Metabolic Panel; Future; Expected date: 10/03/2023  -     TSH; Future; Expected date: 10/03/2023  - Performed today.  Will check Basic labs.  RTC in 1 yr for fu or sooner if needed    Asthma, unspecified asthma severity, unspecified whether complicated, unspecified whether persistent  -     fluticasone propion-salmeterol 115-21 mcg/dose (ADVAIR HFA) 115-21 mcg/actuation HFAA inhaler; Inhale 2 puffs into the lungs every 12 (twelve) hours. Controller  Dispense: 12 g; Refill: 11  -     montelukast (SINGULAIR) 10 mg tablet; Take 1 tablet (10 mg total) by mouth every evening.  Dispense: 30 tablet; Refill: 3  -     fluticasone propionate (FLONASE) 50 mcg/actuation nasal spray; 1 spray (50 mcg total) by Each Nostril route once daily.  Dispense: 18.2 mL; Refill: 3  -     Ambulatory referral/consult to ENT; Future; Expected date: 10/10/2023  - Cont current. Add flonase and singulair. Refer to ENT, Dr. Hein, to lydia for possible Nasal polyp.     Acute rhinitis  -     Ambulatory referral/consult to ENT; Future; Expected date: 10/10/2023  - Rec flonase 2 SEN/D nad resume singulair.  Refer to ENT, Dr. Hein, to lydia for possible Nasal polyp.     Screening for lipoid disorders  -     Lipid Panel; Future; Expected date: 10/03/2023         Chronic conditions status updated as per HPI.  Other than changes above, cont current medications and maintain follow up with specialists.   Follow up in about 1 year (around 10/3/2024) for well visit or sooner if needed.      Sally Corley MD  [facility]

## 2023-10-03 ENCOUNTER — OFFICE VISIT (OUTPATIENT)
Dept: PRIMARY CARE CLINIC | Facility: CLINIC | Age: 43
End: 2023-10-03
Payer: COMMERCIAL

## 2023-10-03 VITALS
BODY MASS INDEX: 23.53 KG/M2 | TEMPERATURE: 98 F | DIASTOLIC BLOOD PRESSURE: 62 MMHG | OXYGEN SATURATION: 99 % | WEIGHT: 146.38 LBS | HEART RATE: 86 BPM | SYSTOLIC BLOOD PRESSURE: 104 MMHG | RESPIRATION RATE: 16 BRPM | HEIGHT: 66 IN

## 2023-10-03 DIAGNOSIS — Z00.00 NORMAL PHYSICAL EXAM, ROUTINE: Primary | ICD-10-CM

## 2023-10-03 DIAGNOSIS — J00 ACUTE RHINITIS: ICD-10-CM

## 2023-10-03 DIAGNOSIS — J45.909 ASTHMA, UNSPECIFIED ASTHMA SEVERITY, UNSPECIFIED WHETHER COMPLICATED, UNSPECIFIED WHETHER PERSISTENT: ICD-10-CM

## 2023-10-03 DIAGNOSIS — Z13.220 SCREENING FOR LIPOID DISORDERS: ICD-10-CM

## 2023-10-03 PROCEDURE — 99999 PR PBB SHADOW E&M-EST. PATIENT-LVL IV: ICD-10-PCS | Mod: PBBFAC,,, | Performed by: INTERNAL MEDICINE

## 2023-10-03 PROCEDURE — 99396 PREV VISIT EST AGE 40-64: CPT | Mod: S$GLB,,, | Performed by: INTERNAL MEDICINE

## 2023-10-03 PROCEDURE — 3074F PR MOST RECENT SYSTOLIC BLOOD PRESSURE < 130 MM HG: ICD-10-PCS | Mod: CPTII,S$GLB,, | Performed by: INTERNAL MEDICINE

## 2023-10-03 PROCEDURE — 3074F SYST BP LT 130 MM HG: CPT | Mod: CPTII,S$GLB,, | Performed by: INTERNAL MEDICINE

## 2023-10-03 PROCEDURE — 1159F PR MEDICATION LIST DOCUMENTED IN MEDICAL RECORD: ICD-10-PCS | Mod: CPTII,S$GLB,, | Performed by: INTERNAL MEDICINE

## 2023-10-03 PROCEDURE — 1159F MED LIST DOCD IN RCRD: CPT | Mod: CPTII,S$GLB,, | Performed by: INTERNAL MEDICINE

## 2023-10-03 PROCEDURE — 3078F PR MOST RECENT DIASTOLIC BLOOD PRESSURE < 80 MM HG: ICD-10-PCS | Mod: CPTII,S$GLB,, | Performed by: INTERNAL MEDICINE

## 2023-10-03 PROCEDURE — 1160F RVW MEDS BY RX/DR IN RCRD: CPT | Mod: CPTII,S$GLB,, | Performed by: INTERNAL MEDICINE

## 2023-10-03 PROCEDURE — 1160F PR REVIEW ALL MEDS BY PRESCRIBER/CLIN PHARMACIST DOCUMENTED: ICD-10-PCS | Mod: CPTII,S$GLB,, | Performed by: INTERNAL MEDICINE

## 2023-10-03 PROCEDURE — 3008F BODY MASS INDEX DOCD: CPT | Mod: CPTII,S$GLB,, | Performed by: INTERNAL MEDICINE

## 2023-10-03 PROCEDURE — 99999 PR PBB SHADOW E&M-EST. PATIENT-LVL IV: CPT | Mod: PBBFAC,,, | Performed by: INTERNAL MEDICINE

## 2023-10-03 PROCEDURE — 99396 PR PREVENTIVE VISIT,EST,40-64: ICD-10-PCS | Mod: S$GLB,,, | Performed by: INTERNAL MEDICINE

## 2023-10-03 PROCEDURE — 3078F DIAST BP <80 MM HG: CPT | Mod: CPTII,S$GLB,, | Performed by: INTERNAL MEDICINE

## 2023-10-03 PROCEDURE — 3008F PR BODY MASS INDEX (BMI) DOCUMENTED: ICD-10-PCS | Mod: CPTII,S$GLB,, | Performed by: INTERNAL MEDICINE

## 2023-10-03 RX ORDER — FLUTICASONE PROPIONATE AND SALMETEROL XINAFOATE 115; 21 UG/1; UG/1
2 AEROSOL, METERED RESPIRATORY (INHALATION) EVERY 12 HOURS
Qty: 12 G | Refills: 11 | Status: SHIPPED | OUTPATIENT
Start: 2023-10-03

## 2023-10-03 RX ORDER — MONTELUKAST SODIUM 10 MG/1
10 TABLET ORAL NIGHTLY
Qty: 30 TABLET | Refills: 3 | Status: SHIPPED | OUTPATIENT
Start: 2023-10-03

## 2023-10-03 RX ORDER — FLUTICASONE PROPIONATE 50 MCG
1 SPRAY, SUSPENSION (ML) NASAL DAILY
Qty: 18.2 ML | Refills: 3 | Status: SHIPPED | OUTPATIENT
Start: 2023-10-03

## 2023-10-04 ENCOUNTER — LAB VISIT (OUTPATIENT)
Dept: LAB | Facility: HOSPITAL | Age: 43
End: 2023-10-04
Attending: INTERNAL MEDICINE
Payer: COMMERCIAL

## 2023-10-04 DIAGNOSIS — Z13.220 SCREENING FOR LIPOID DISORDERS: ICD-10-CM

## 2023-10-04 DIAGNOSIS — Z00.00 NORMAL PHYSICAL EXAM, ROUTINE: ICD-10-CM

## 2023-10-04 LAB
ALBUMIN SERPL BCP-MCNC: 3.8 G/DL (ref 3.5–5.2)
ALP SERPL-CCNC: 52 U/L (ref 55–135)
ALT SERPL W/O P-5'-P-CCNC: 17 U/L (ref 10–44)
ANION GAP SERPL CALC-SCNC: 10 MMOL/L (ref 8–16)
AST SERPL-CCNC: 17 U/L (ref 10–40)
BASOPHILS # BLD AUTO: 0.1 K/UL (ref 0–0.2)
BASOPHILS NFR BLD: 1.2 % (ref 0–1.9)
BILIRUB SERPL-MCNC: 0.5 MG/DL (ref 0.1–1)
BUN SERPL-MCNC: 8 MG/DL (ref 6–20)
CALCIUM SERPL-MCNC: 9 MG/DL (ref 8.7–10.5)
CHLORIDE SERPL-SCNC: 108 MMOL/L (ref 95–110)
CHOLEST SERPL-MCNC: 150 MG/DL (ref 120–199)
CHOLEST/HDLC SERPL: 2.5 {RATIO} (ref 2–5)
CO2 SERPL-SCNC: 20 MMOL/L (ref 23–29)
CREAT SERPL-MCNC: 0.7 MG/DL (ref 0.5–1.4)
DIFFERENTIAL METHOD: NORMAL
EOSINOPHIL # BLD AUTO: 0.5 K/UL (ref 0–0.5)
EOSINOPHIL NFR BLD: 6.7 % (ref 0–8)
ERYTHROCYTE [DISTWIDTH] IN BLOOD BY AUTOMATED COUNT: 12.9 % (ref 11.5–14.5)
EST. GFR  (NO RACE VARIABLE): >60 ML/MIN/1.73 M^2
GLUCOSE SERPL-MCNC: 85 MG/DL (ref 70–110)
HCT VFR BLD AUTO: 41.4 % (ref 37–48.5)
HDLC SERPL-MCNC: 60 MG/DL (ref 40–75)
HDLC SERPL: 40 % (ref 20–50)
HGB BLD-MCNC: 13.8 G/DL (ref 12–16)
IMM GRANULOCYTES # BLD AUTO: 0.02 K/UL (ref 0–0.04)
IMM GRANULOCYTES NFR BLD AUTO: 0.2 % (ref 0–0.5)
LDLC SERPL CALC-MCNC: 80.4 MG/DL (ref 63–159)
LYMPHOCYTES # BLD AUTO: 2.2 K/UL (ref 1–4.8)
LYMPHOCYTES NFR BLD: 27 % (ref 18–48)
MCH RBC QN AUTO: 30.9 PG (ref 27–31)
MCHC RBC AUTO-ENTMCNC: 33.3 G/DL (ref 32–36)
MCV RBC AUTO: 93 FL (ref 82–98)
MONOCYTES # BLD AUTO: 0.5 K/UL (ref 0.3–1)
MONOCYTES NFR BLD: 6.7 % (ref 4–15)
NEUTROPHILS # BLD AUTO: 4.7 K/UL (ref 1.8–7.7)
NEUTROPHILS NFR BLD: 58.2 % (ref 38–73)
NONHDLC SERPL-MCNC: 90 MG/DL
NRBC BLD-RTO: 0 /100 WBC
PLATELET # BLD AUTO: 335 K/UL (ref 150–450)
PMV BLD AUTO: 11.2 FL (ref 9.2–12.9)
POTASSIUM SERPL-SCNC: 4.2 MMOL/L (ref 3.5–5.1)
PROT SERPL-MCNC: 6.6 G/DL (ref 6–8.4)
RBC # BLD AUTO: 4.47 M/UL (ref 4–5.4)
SODIUM SERPL-SCNC: 138 MMOL/L (ref 136–145)
TRIGL SERPL-MCNC: 48 MG/DL (ref 30–150)
TSH SERPL DL<=0.005 MIU/L-ACNC: 1.23 UIU/ML (ref 0.4–4)
WBC # BLD AUTO: 8.08 K/UL (ref 3.9–12.7)

## 2023-10-04 PROCEDURE — 80053 COMPREHEN METABOLIC PANEL: CPT | Performed by: INTERNAL MEDICINE

## 2023-10-04 PROCEDURE — 80061 LIPID PANEL: CPT | Performed by: INTERNAL MEDICINE

## 2023-10-04 PROCEDURE — 84443 ASSAY THYROID STIM HORMONE: CPT | Performed by: INTERNAL MEDICINE

## 2023-10-04 PROCEDURE — 85025 COMPLETE CBC W/AUTO DIFF WBC: CPT | Performed by: INTERNAL MEDICINE

## 2023-10-04 PROCEDURE — 36415 COLL VENOUS BLD VENIPUNCTURE: CPT | Performed by: INTERNAL MEDICINE

## 2023-10-05 NOTE — PROGRESS NOTES
I sent pt a my chart message -  I reviewed your labs.  Your thyroid functions are normal.  Your Cholesterol looked good.  Your kidney function and liver functions looked good.  Your CO2 was slightly  low. Have you had any rcent GI issues like diarrhea?  You are not anemic. No further recommendations at this time.    Dr. HAM

## 2024-04-17 ENCOUNTER — PATIENT OUTREACH (OUTPATIENT)
Dept: ADMINISTRATIVE | Facility: HOSPITAL | Age: 44
End: 2024-04-17
Payer: COMMERCIAL

## 2024-04-17 DIAGNOSIS — Z12.31 OTHER SCREENING MAMMOGRAM: ICD-10-CM

## 2024-04-17 NOTE — PROGRESS NOTES
Patient due for the following    Pneumococcal Vaccines (Age 0-64) (1 of 2 - PCV)    HIV Screening     Influenza Vaccine (1)    COVID-19 Vaccine (4 - 2023-24 season)    Mammogram       E-faxed Dr. Kidd for mammogram records     Immunizations: reviewed and updated  Care Everywhere: triggered  Care Teams: up to date  Outreach: none needed

## 2024-04-24 DIAGNOSIS — Z12.31 OTHER SCREENING MAMMOGRAM: ICD-10-CM

## 2024-05-08 DIAGNOSIS — Z12.31 OTHER SCREENING MAMMOGRAM: ICD-10-CM

## 2024-08-15 ENCOUNTER — OFFICE VISIT (OUTPATIENT)
Dept: URGENT CARE | Facility: CLINIC | Age: 44
End: 2024-08-15
Payer: COMMERCIAL

## 2024-08-15 VITALS
WEIGHT: 146 LBS | HEART RATE: 81 BPM | DIASTOLIC BLOOD PRESSURE: 76 MMHG | TEMPERATURE: 98 F | BODY MASS INDEX: 23.46 KG/M2 | RESPIRATION RATE: 18 BRPM | OXYGEN SATURATION: 98 % | HEIGHT: 66 IN | SYSTOLIC BLOOD PRESSURE: 112 MMHG

## 2024-08-15 DIAGNOSIS — R10.30 LOWER ABDOMINAL PAIN: ICD-10-CM

## 2024-08-15 DIAGNOSIS — R10.84 ABDOMINAL CRAMPING, GENERALIZED: Primary | ICD-10-CM

## 2024-08-15 LAB
B-HCG UR QL: NEGATIVE
BILIRUBIN, UA POC OHS: NEGATIVE
BLOOD, UA POC OHS: NEGATIVE
CLARITY, UA POC OHS: CLEAR
COLOR, UA POC OHS: YELLOW
CTP QC/QA: YES
GLUCOSE, UA POC OHS: NEGATIVE
KETONES, UA POC OHS: NEGATIVE
LEUKOCYTES, UA POC OHS: NEGATIVE
NITRITE, UA POC OHS: NEGATIVE
PH, UA POC OHS: 6
PROTEIN, UA POC OHS: NEGATIVE
SPECIFIC GRAVITY, UA POC OHS: 1.02
UROBILINOGEN, UA POC OHS: 0.2

## 2024-08-15 PROCEDURE — 74019 RADEX ABDOMEN 2 VIEWS: CPT | Mod: FY,S$GLB,, | Performed by: RADIOLOGY

## 2024-08-15 PROCEDURE — 81025 URINE PREGNANCY TEST: CPT | Mod: S$GLB,,,

## 2024-08-15 PROCEDURE — 81003 URINALYSIS AUTO W/O SCOPE: CPT | Mod: QW,S$GLB,,

## 2024-08-15 PROCEDURE — 99213 OFFICE O/P EST LOW 20 MIN: CPT | Mod: S$GLB,,,

## 2024-08-15 RX ORDER — DICYCLOMINE HYDROCHLORIDE 20 MG/1
20 TABLET ORAL EVERY 6 HOURS PRN
Qty: 28 TABLET | Refills: 0 | Status: SHIPPED | OUTPATIENT
Start: 2024-08-15 | End: 2024-08-22

## 2024-08-15 NOTE — PATIENT INSTRUCTIONS
About this topic   Abdominal or belly pain is pain between your chest and hips. Sometimes, it is a sign of a problem that is not very serious. Viruses or germs are a common cause. Overeating, gas pains, and food poisoning can all cause belly pain. Stools that are too loose or too hard can also cause belly pain. Sometimes, belly pain is a sign of a very bad health problem, like bleeding or an infection. How bad the pain is does not reflect how bad the problem may be. Some serious problems can cause very little pain.    Treatment  Bentyl (dicyclomine) 20 mg every 6 hours as needed for abdominal cramping. You can take up to 4 times daily.     Follow up  Please arrange follow up with your GI doctor if symptoms persist     Care at home  Drink at least 6 to 8 glasses of fluids each day. This will help replace fluids lost from increased stools or from throwing up. Good fluids to drink are water and oral electrolyte solutions that you can buy at most supermarkets or drugstores.  Heat may help ease your belly pain. Put a heating pad on the painful part for no more than 20 minutes at a time. Never go to sleep with a heating pad on as this can cause burns.  Eat small meals more often. Eat more fiber if hard stools are a problem.  Avoid foods or drinks that make your pain worse. Some people are bothered by:  Drinks that are fizzy or have caffeine.  Fried, greasy, or fatty foods.  Orange juice.  Milk or cheese can bother some peoples stomach as well.  When you have pain, you can:  Try to have a bowel movement.  Lie down and rest.  Avoid solid foods for a few hours. If you are hungry, try liquids like broth or water. When you feel better, try mild foods like rice, crackers, bananas, applesauce, or toast.  Dont take over-the-counter medicines, such as antacids or laxatives, unless they are ordered.  Check with the doctor before you take any herbal medicines or supplements.    Go to the emergency room if:  Signs of dehydration.  These include dark-colored urine or no urine for more than 8 hours, dry mouth and tongue, dry skin, sunken eyes, lack of energy, feeling faint, or passing out.  Bruising, new or increasing bloody stools, confusion, or very bad belly pain  Signs of infection. These include a fever of 100.4°F (38°C) or higher; chills.  Frequent loose stools that last more than a few days.  Trouble keeping fluids down or you keep throwing up.  You are not feeling better in 2 to 3 days or you are feeling worse.  You start throwing up blood or pass a lot of blood in your stool.  Your belly becomes very hard or swollen.  Your pain gets worse, comes more often, or moves to one area of the belly.  You have an upset stomach or throwing up that isnt getting better and are having trouble keeping down food and drink.  Your stools are black or tar-colored.  You have pain with passing urine or have blood in your urine.  Your stools have a small amount (less than 1 teaspoon or 5 mL) of blood in them.  You feel that something is not right in your belly.    Should you develop any worsening or new symptoms after leaving urgent care, it is recommended that you go to the ER for further/repeat evaluation.      Follow up with your PCP in 3-5 days after your urgent care visit.     Please remember that you have received care at an urgent care today. Urgent cares are not emergency rooms and are not equipped to handle life threatening emergencies and cannot rule in or out certain medical conditions and you may be released before all of your medical problems are known or treated, please schedule all follow up appointments as discussed and if you have worsening symptoms please go to the ER to rule out potential life threatening problems, as discussed.

## 2024-08-15 NOTE — PROGRESS NOTES
"Subjective:      Patient ID: Tami Ludwig is a 43 y.o. female.    Vitals:  height is 5' 6" (1.676 m) and weight is 66.2 kg (146 lb). Her oral temperature is 98.3 °F (36.8 °C). Her blood pressure is 112/76 and her pulse is 81. Her respiration is 18 and oxygen saturation is 98%.     Chief Complaint: Abdominal Pain    This is a 43 y.o. female who presents today with a chief complaint of abdominal pain. Patient has been having lower abdominal pain since Tuesday morning.     Provider note starts below:  Patient presents to clinic for evaluation of lower abdominal pain. States she was out of town traveling and woke up early Tuesday morning due to pain in the lower abdomen. She describes pain as "crampy" sensation, similar to gas pain or a menstrual cramp. She tried taking Gas-X at that time with no improvement of pain. States her pain was worse yesterday. She also had to travel home on a plane which did not help her symptoms. She continued to try Gas-X and Imodium with no improvement. She did have some improvement of pain today from yesterday, but pain still present and bothersome. Pain is worse with standing or sitting upright, and improves with hunching over. She denies any associated symptoms. States she has a history of microscopic colitis found on colonoscopy so she has chronic diarrhea, but she has not had loose stools over the past few days. She has been having solid bowel movements, most recent was this morning. She denies blood in the stool, mucus in the stool, black stools. She denies any urinary or vaginal complaints. Regular menstrual cycles, LMP was 8/12/24. Denies nausea, vomiting, fever, chills, rash, chest pain, shortness of breath, headache. She does report recent change of medications. She was started on spironolactone for hormonal acne approximately 1 month ago. She was prescribed 100 mg daily but could not tolerate the side effects. She took 1/4 pill daily for 2 weeks, then increased to 1/2 pill. " States she did not take spironolactone while she was on her trip as she forgot it at home. Otherwise, no new changes to medications. Surgical hx significant for appendectomy.     Abdominal Pain  This is a new problem. The current episode started in the past 7 days. The onset quality is sudden. The problem occurs constantly. The problem has been unchanged. The pain is located in the generalized abdominal region. The pain is at a severity of 6/10. The pain is moderate. The quality of the pain is aching. The abdominal pain does not radiate. Pertinent negatives include no constipation, diarrhea, dysuria, fever, frequency, headaches, hematochezia, hematuria, nausea or vomiting. Nothing aggravates the pain. The pain is relieved by Nothing. Treatments tried: gas x and immodium. The treatment provided no relief.       Constitution: Negative for appetite change, chills and fever.   HENT:  Negative for sore throat and trouble swallowing.    Neck: Negative for neck pain and neck stiffness.   Cardiovascular:  Negative for chest pain and palpitations.   Respiratory:  Negative for cough and shortness of breath.    Gastrointestinal:  Positive for abdominal pain (lower). Negative for abdominal bloating, nausea, vomiting, constipation, diarrhea, bright red blood in stool, dark colored stools, rectal bleeding, rectal pain and heartburn.   Genitourinary:  Negative for dysuria, frequency, urgency, flank pain, hematuria, vaginal pain, vaginal discharge and vaginal bleeding.   Musculoskeletal:  Negative for back pain.   Skin:  Negative for pale, rash and hives.   Allergic/Immunologic: Negative for hives and itching.   Neurological:  Negative for dizziness, light-headedness, headaches, disorientation and altered mental status.   Psychiatric/Behavioral:  Negative for altered mental status, disorientation and confusion.       Objective:     Physical Exam   Constitutional: She is oriented to person, place, and time.  Non-toxic appearance.  She does not appear ill. No distress.      Comments:Sitting comfortably in exam room. No acute distress.     HENT:   Head: Normocephalic and atraumatic.   Eyes: Conjunctivae are normal. Extraocular movement intact   Neck: Neck supple. No neck rigidity present.   Cardiovascular: Normal rate, regular rhythm, normal heart sounds and normal pulses.   Pulmonary/Chest: Effort normal and breath sounds normal.   Abdominal: Normal appearance and bowel sounds are normal. She exhibits no distension and no mass. Soft. flat abdomen There is abdominal tenderness (suprapubic). There is no rebound and no guarding. No hernia.   Musculoskeletal: Normal range of motion.         General: Normal range of motion.   Neurological: She is alert, oriented to person, place, and time and at baseline.   Skin: Skin is warm, dry, not diaphoretic, not pale and no rash. jaundice  Psychiatric: Her behavior is normal. Mood normal.   Nursing note and vitals reviewed.    Assessment:     XR Abdomen  FINDINGS:  No significant stool retention.  Nonspecific bowel gas pattern.  No bowel dilation.  The upright film demonstrate no air-fluid level.  No organomegaly.  No abnormal calcifications.  The lung bases are clear.  No free air.     Impression:  No significant abnormality seen     Electronically signed by:Mitzi Laguerre MD  Date:                                            08/15/2024  Time:                                           17:00    Results for orders placed or performed in visit on 08/15/24   POCT Urinalysis(Instrument)   Result Value Ref Range    Color, POC UA Yellow Yellow, Straw, Colorless    Clarity, POC UA Clear Clear    Glucose, POC UA Negative Negative    Bilirubin, POC UA Negative Negative    Ketones, POC UA Negative Negative    Spec Grav POC UA 1.025 1.005 - 1.030    Blood, POC UA Negative Negative    pH, POC UA 6.0 5.0 - 8.0    Protein, POC UA Negative Negative    Urobilinogen, POC UA 0.2 <=1.0    Nitrite, POC UA Negative Negative     WBC, POC UA Negative Negative   POCT urine pregnancy   Result Value Ref Range    POC Preg Test, Ur Negative Negative     Acceptable Yes        1. Abdominal cramping, generalized    2. Lower abdominal pain        Plan:     Abdominal cramping, generalized  -     dicyclomine (BENTYL) 20 mg tablet; Take 1 tablet (20 mg total) by mouth every 6 (six) hours as needed (for abdominal cramping).  Dispense: 28 tablet; Refill: 0    Lower abdominal pain  -     POCT Urinalysis(Instrument)  -     POCT urine pregnancy  -     XR ABDOMEN FLAT AND ERECT; Future; Expected date: 08/15/2024      Medical Decision Making:   Urgent Care Management:  UPT negative.  UA wnl.  XR Abd shows no acute findings.    I discussed with patient that evaluation in urgent care does not suggest any emergent or life threatening medical conditions requiring immediate intervention beyond what was provided, and I believe patient is safe to go home.  Regardless, an unremarkable evaluation does not preclude the development or presence of a serious of life threatening condition. As such, patient was instructed to go to the ED for any worsening or change in current symptoms.           Patient Instructions   About this topic   Abdominal or belly pain is pain between your chest and hips. Sometimes, it is a sign of a problem that is not very serious. Viruses or germs are a common cause. Overeating, gas pains, and food poisoning can all cause belly pain. Stools that are too loose or too hard can also cause belly pain. Sometimes, belly pain is a sign of a very bad health problem, like bleeding or an infection. How bad the pain is does not reflect how bad the problem may be. Some serious problems can cause very little pain.    Treatment  Bentyl (dicyclomine) 20 mg every 6 hours as needed for abdominal cramping. You can take up to 4 times daily.     Follow up  Please arrange follow up with your GI doctor if symptoms persist     Care at home  Drink at  least 6 to 8 glasses of fluids each day. This will help replace fluids lost from increased stools or from throwing up. Good fluids to drink are water and oral electrolyte solutions that you can buy at most supermarkets or drugstores.  Heat may help ease your belly pain. Put a heating pad on the painful part for no more than 20 minutes at a time. Never go to sleep with a heating pad on as this can cause burns.  Eat small meals more often. Eat more fiber if hard stools are a problem.  Avoid foods or drinks that make your pain worse. Some people are bothered by:  Drinks that are fizzy or have caffeine.  Fried, greasy, or fatty foods.  Orange juice.  Milk or cheese can bother some peoples stomach as well.  When you have pain, you can:  Try to have a bowel movement.  Lie down and rest.  Avoid solid foods for a few hours. If you are hungry, try liquids like broth or water. When you feel better, try mild foods like rice, crackers, bananas, applesauce, or toast.  Dont take over-the-counter medicines, such as antacids or laxatives, unless they are ordered.  Check with the doctor before you take any herbal medicines or supplements.    Go to the emergency room if:  Signs of dehydration. These include dark-colored urine or no urine for more than 8 hours, dry mouth and tongue, dry skin, sunken eyes, lack of energy, feeling faint, or passing out.  Bruising, new or increasing bloody stools, confusion, or very bad belly pain  Signs of infection. These include a fever of 100.4°F (38°C) or higher; chills.  Frequent loose stools that last more than a few days.  Trouble keeping fluids down or you keep throwing up.  You are not feeling better in 2 to 3 days or you are feeling worse.  You start throwing up blood or pass a lot of blood in your stool.  Your belly becomes very hard or swollen.  Your pain gets worse, comes more often, or moves to one area of the belly.  You have an upset stomach or throwing up that isnt getting better and  are having trouble keeping down food and drink.  Your stools are black or tar-colored.  You have pain with passing urine or have blood in your urine.  Your stools have a small amount (less than 1 teaspoon or 5 mL) of blood in them.  You feel that something is not right in your belly.    Should you develop any worsening or new symptoms after leaving urgent care, it is recommended that you go to the ER for further/repeat evaluation.      Follow up with your PCP in 3-5 days after your urgent care visit.     Please remember that you have received care at an urgent care today. Urgent cares are not emergency rooms and are not equipped to handle life threatening emergencies and cannot rule in or out certain medical conditions and you may be released before all of your medical problems are known or treated, please schedule all follow up appointments as discussed and if you have worsening symptoms please go to the ER to rule out potential life threatening problems, as discussed.

## 2024-09-05 ENCOUNTER — ON-DEMAND VIRTUAL (OUTPATIENT)
Dept: URGENT CARE | Facility: CLINIC | Age: 44
End: 2024-09-05
Payer: COMMERCIAL

## 2024-09-05 DIAGNOSIS — J45.909 ASTHMA, UNSPECIFIED ASTHMA SEVERITY, UNSPECIFIED WHETHER COMPLICATED, UNSPECIFIED WHETHER PERSISTENT: ICD-10-CM

## 2024-09-05 DIAGNOSIS — J45.901 EXACERBATION OF ASTHMA, UNSPECIFIED ASTHMA SEVERITY, UNSPECIFIED WHETHER PERSISTENT: ICD-10-CM

## 2024-09-05 DIAGNOSIS — J06.9 UPPER RESPIRATORY TRACT INFECTION, UNSPECIFIED TYPE: Primary | ICD-10-CM

## 2024-09-05 RX ORDER — PREDNISONE 20 MG/1
20 TABLET ORAL DAILY
Qty: 5 TABLET | Refills: 0 | Status: SHIPPED | OUTPATIENT
Start: 2024-09-05 | End: 2024-09-10

## 2024-09-05 RX ORDER — ALBUTEROL SULFATE 90 UG/1
2 INHALANT RESPIRATORY (INHALATION) EVERY 6 HOURS PRN
Qty: 6.7 G | Refills: 0 | Status: SHIPPED | OUTPATIENT
Start: 2024-09-05

## 2024-09-05 RX ORDER — FLUTICASONE PROPIONATE AND SALMETEROL XINAFOATE 115; 21 UG/1; UG/1
2 AEROSOL, METERED RESPIRATORY (INHALATION) EVERY 12 HOURS
Qty: 12 G | Refills: 0 | Status: SHIPPED | OUTPATIENT
Start: 2024-09-05

## 2024-09-05 RX ORDER — AZITHROMYCIN 250 MG/1
TABLET, FILM COATED ORAL
Qty: 6 TABLET | Refills: 0 | Status: SHIPPED | OUTPATIENT
Start: 2024-09-05

## 2024-09-05 NOTE — PROGRESS NOTES
Subjective:      Patient ID: Tami Ludwig is a 43 y.o. female.    Vitals:  vitals were not taken for this visit.     Chief Complaint: URI      Visit Type: TELE AUDIOVISUAL    Present with the patient at the time of consultation: TELEMED PRESENT WITH PATIENT: None, at home    Past Medical History:   Diagnosis Date    Asthma     Chronic diarrhea     COVID-19 04/04/2023    Microscopic colitis     Pneumonia, unspecified organism 11/30/2022     Past Surgical History:   Procedure Laterality Date    APPENDECTOMY      AUGMENTATION OF BREAST      BREAST SURGERY  December 2006    COLONOSCOPY N/A 07/12/2021    Procedure: COLONOSCOPY;  Surgeon: Raffaele Trotter MD;  Location: [address]);  Service: Endoscopy;  Laterality: N/A;  Fully vacc Covid-19 (will upload to record or bring card) - pg     Review of patient's allergies indicates:  No Known Allergies  Current Outpatient Medications on File Prior to Visit   Medication Sig Dispense Refill    albuterol (PROVENTIL) 2.5 mg /3 mL (0.083 %) nebulizer solution Take 3 mLs (2.5 mg total) by nebulization every 6 (six) hours as needed for Wheezing. Rescue 45 mL 0    cetirizine (ZYRTEC) 10 MG tablet Take 10 mg by mouth once daily.      fluticasone propionate (FLONASE) 50 mcg/actuation nasal spray 1 spray (50 mcg total) by Each Nostril route once daily. (Patient not taking: Reported on 8/15/2024) 18.2 mL 3    [DISCONTINUED] albuterol (PROVENTIL/VENTOLIN HFA) 90 mcg/actuation inhaler INHALE 2 PUFFS INTO THE LUNGS EVERY 6 HOURS AS NEEDED FOR WHEEZING. RESCUE 6.7 g 1    [DISCONTINUED] fluticasone propion-salmeterol 115-21 mcg/dose (ADVAIR HFA) 115-21 mcg/actuation HFAA inhaler Inhale 2 puffs into the lungs every 12 (twelve) hours. Controller 12 g 11    [DISCONTINUED] montelukast (SINGULAIR) 10 mg tablet Take 1 tablet (10 mg total) by mouth every evening. (Patient not taking: Reported on 8/15/2024) 30 tablet 3     No current facility-administered medications on file prior to  visit.     Family History   Problem Relation Name Age of Onset    Hypertension Mother Bree Matthews     Rheum arthritis Father Eddie Matthews     Cancer Father Eddie Matthews 66        Oral    Hypertension Father Eddie Matthews     Crohn's disease Father Eddie Matthews     Thyroid disease Sister Zahra Dukes     Rheum arthritis Sister Zahra Dukes     Hashimoto's thyroiditis Sister Zahra Dukes     Raynaud syndrome Sister Zahra Dukes     Kidney disease Maternal Grandmother Swapna Mendez     Stroke Paternal Grandfather Nico Vanessa     Rheum arthritis Paternal Grandfather Nico Mendez        Medications Ordered                [id number] - [place] - [address] AT [facility] & [place] [address]   [address] [address]    Telephone: [phone]   Fax: [phone]   Hours: Open 24 hours                         E-Prescribed (4 of 4)              albuterol (PROVENTIL/VENTOLIN HFA) 90 mcg/actuation inhaler    Sig: Inhale 2 puffs into the lungs every 6 (six) hours as needed for Wheezing.       Start: 9/5/24     Quantity: 6.7 g Refills: 0                         azithromycin (Z-BECKI) 250 MG tablet    Sig: Take 2 tablets by mouth on day 1; Take 1 tablet by mouth on days 2-5       Start: 9/5/24     Quantity: 6 tablet Refills: 0                         fluticasone propion-salmeterol 115-21 mcg/dose (ADVAIR HFA) 115-21 mcg/actuation HFAA inhaler    Sig: Inhale 2 puffs into the lungs every 12 (twelve) hours. Controller       Start: 9/5/24     Quantity: 12 g Refills: 0                         predniSONE (DELTASONE) 20 MG tablet    Sig: Take 1 tablet (20 mg total) by mouth once daily. for 5 days       Start: 9/5/24     Quantity: 5 tablet Refills: 0                           Ohs Peq Odvv Intake    9/5/2024 10:16 AM CDT - Filed by Patient   What is your current physical address in the event of a medical emergency? [address] [place] [address]   Are you able to take your vital signs? No   Please  attach any relevant images or files          URI symptoms for over a week. Productive cough and wheezing. Using MDI with little relief. No testing done.    URI   Associated symptoms include coughing and a sore throat. Pertinent negatives include no congestion, diarrhea, ear pain, nausea, sinus pain or vomiting.       Constitution: Negative for chills and fever.   HENT:  Positive for postnasal drip and sore throat. Negative for ear pain, congestion, sinus pain, sinus pressure, trouble swallowing and voice change.    Respiratory:  Positive for cough, sputum production and asthma.    Gastrointestinal:  Negative for nausea, vomiting and diarrhea.   Musculoskeletal:  Negative for muscle ache.   Allergic/Immunologic: Positive for asthma.        Objective:   The physical exam was conducted virtually.  Physical Exam   Constitutional: She is oriented to person, place, and time. She does not appear ill. No distress.   HENT:   Head: Normocephalic and atraumatic.   Nose: Nose normal.   Eyes: Extraocular movement intact   Pulmonary/Chest: Effort normal.   Abdominal: Normal appearance.   Musculoskeletal: Normal range of motion.         General: Normal range of motion.   Neurological: no focal deficit. She is alert and oriented to person, place, and time.   Psychiatric: Her behavior is normal. Mood normal.   Vitals reviewed.      Assessment:     1. Upper respiratory tract infection, unspecified type    2. Exacerbation of asthma, unspecified asthma severity, unspecified whether persistent    3. Asthma, unspecified asthma severity, unspecified whether complicated, unspecified whether persistent        Plan:   Patient encouraged to monitor symptoms closely and instructed to follow-up for new or worsening symptoms. Further, in-person, evaluation may be necessary for continued treatment. Please follow up with your primary care doctor or specialist as needed. Verbally discussed plan. Patient confirms understanding and is in agreement  with treatment and plan.     You must understand that you've received a Virtual Care evaluation only and that you may be released before all your medical problems are known or treated. You, the patient, will arrange for follow up care as instructed.      Upper respiratory tract infection, unspecified type  -     predniSONE (DELTASONE) 20 MG tablet; Take 1 tablet (20 mg total) by mouth once daily. for 5 days  Dispense: 5 tablet; Refill: 0  -     azithromycin (Z-BECKI) 250 MG tablet; Take 2 tablets by mouth on day 1; Take 1 tablet by mouth on days 2-5  Dispense: 6 tablet; Refill: 0    Exacerbation of asthma, unspecified asthma severity, unspecified whether persistent  -     albuterol (PROVENTIL/VENTOLIN HFA) 90 mcg/actuation inhaler; Inhale 2 puffs into the lungs every 6 (six) hours as needed for Wheezing.  Dispense: 6.7 g; Refill: 0    Asthma, unspecified asthma severity, unspecified whether complicated, unspecified whether persistent  -     fluticasone propion-salmeterol 115-21 mcg/dose (ADVAIR HFA) 115-21 mcg/actuation HFAA inhaler; Inhale 2 puffs into the lungs every 12 (twelve) hours. Controller  Dispense: 12 g; Refill: 0        Patient Instructions   OVER THE COUNTER RECOMMENDATIONS/SUGGESTIONS (IF NO CONTRAINDICATIONS).     ·         Make sure to stay well hydrated.     ·         Use Nasal Saline to mechanically move any post nasal drip from your eustachian tube or from the back of your throat.     ·         Use warm saltwater gargles to ease your throat pain. Warm saltwater gargles as needed for sore throat-  1/2 tsp salt to 1 cup warm water, gargle as desired. Warm fluids tend to relieve a sore throat.     .         Throat lozenges, Chloraseptic spray or other over the counter treatments are ok to use as well. Use as directed.     ·         Use an antihistamine such as Claritin, Zyrtec or Allegra to dry you out.     ·         Use pseudoephedrine (behind the counter) to decongest. Pseudoephedrine  30 mg up to  240 mg /day. It can raise your blood pressure and give you palpitations.     ·         Use Mucinex (guaifenesin) to break up mucous up to 2400mg/day to loosen any mucous.     ·         The Mucinex DM pill has a cough suppressant that can be sedating. It can be used at night to stop the tickle at the back of your throat.     ·         You can use Mucinex D (it has guaifenesin and a high dose of pseudoephedrine) in the mornings to help decongest.     ·         Use Afrin (oxymetazoline) in each nare for no longer than 3 days, as it is addictive. It can also dry out your mucous membranes and cause elevated blood pressure. This is especially useful if you are flying.     ·         Use Flonase 1-2 sprays/nostril per day. It is a local acting steroid nasal spray, if you develop a bloody nose, stop using the medication immediately.     ·         Sometimes Nyquil at night is beneficial to help you get some rest, however it is sedating, and it does have an antihistamine, and Tylenol.     ·         Honey is a natural cough suppressant that can be used.     ·         Tylenol up to 4,000 mg a day is safe for short periods and can be used for body aches, pain, and fever. However, in high doses and prolonged use it can cause liver irritation.     ·         Ibuprofen is a non-steroidal anti-inflammatory that can be used for body aches, pain, and fever. However, it can also cause stomach irritation if overused.

## 2024-09-20 NOTE — PATIENT INSTRUCTIONS
PLEASE READ YOUR DISCHARGE INSTRUCTIONS ENTIRELY AS IT CONTAINS IMPORTANT INFORMATION.    Take antibiotics to completion    Please drink plenty of fluids.    Please get plenty of rest.    Please return here or go to the Emergency Department for any concerns or worsening of condition.      Tylenol or ibuprofen can also be used as directed for pain and fever unless you have an allergy to them or medical condition such as stomach ulcers, kidney or liver disease or blood thinners etc for which you should not be taking these type of medications.   YOU CAN ALTERNATE TYLENOL AND IBUPROFEN EVERY 3-4 HOURS. Take 1000mg (2 pills) of Extra Strength Acetaminophen (Tylenol) every 6 hours and 600mg (3 pills) of Ibuprofen (Motrin/Advil) every 6 hours, alternating the two so that every 3 hours you take one or the other. Start with the Tylenol, then 3 hours later take the Ibuprofen, then 3 hours later take the Tylenol again, and so on.       For your allergy symptoms and/or runny nose, sinus/ear pressure, congestion:     Please take an over the counter antihistamine medication (allegra/Claritin/Zyrtec) of your choice as directed and Sudafed (the one behind the counter at the pharmacy) or Mucinex-D (if it gives you funny heartbeats you can switch to regular mucinex). If you do have Hypertension or palpitations, it is safe to take Coricidin HBP for relief of sinus symptoms.   If you have high blood pressure -- AVOID DECONGESTANTS (I.e., Phenylephrine and Pseudoephedrine). Coricidin HBP is safe to take.     Use over the counter flonase or nasocort: one spray each nostril twice daily OR two sprays each nostril once daily until nares dry out, unless you have Glaucoma.   If you find this dries your nose out or your nose bleeds, try using over the counter nasal saline a few minutes prior to using the flonase to moisten the lining of your nose and throughout the day as needed.   Flonase/nasal spray use directions:  1) Use once per day.  2)  Blow nose first.  3) Close one nostril and spray flonase up the other nostril while inhaling gently.   4) If you inhale to aggressively, you will have a bitter taste in the back of your mouth.       You can try breathe right strips at night to help you breathe.  A cool mist humidifier in bedroom may help with cough and relieve stuffy nose.     Sinus rinses DO NOT USE TAP WATER, if you must, water must be a rolling boil for 1 minute, let it cool, then use.  May use distilled water, or over the counter nasal saline rinses.  Vics vapor rub in shower to help open nasal passages.  May use nasal gel to keep passages moisturized.  May use Nasal saline sprays during the day for added relief of congestion.   For those who go to the gym, please do not use the sauna or steam room now to clear sinuses.      Sore throat recommendations: Warm fluids, warm salt water gargles, throat lozenges, tea, honey, soup, rest, hydration.      Cough     Honey with adan to soothe your throat    Robitussin or Delsyum for cough suppressant for dry cough.    Mucinex DM or products containing Guaifenesin or Dextromethorphan for expectorant (wet cough).    Take prescription cough meds (pills) as prescribed; take prescription cough syrup at night as needed for cough.  Do not take both the prescribed cough pills and syrup at the same time or within 6 hours of each other.  Do not take the cough syrup with any other sedative medication as it can can cause drowsiness. Do not operate any heavy machinery, drink or drive while taking the cough syrup.    Try taking half a dose first of the cough syrup to see how it affects you.       Please follow up with your primary care doctor or specialist in the next 48-72hrs as needed and if no improvement    If you  smoke, please stop smoking.      Please return or see your primary care doctor if you develop new or worsening symptoms.     Please arrange follow up with your primary medical clinic as soon as  possible. You must understand that you've received an Urgent Care treatment only and that you may be released before all of your medical problems are known or treated. You, the patient, will arrange for follow up as instructed. If your symptoms worsen or fail to improve you should go to the Emergency Room.     How Severe Are Your Spot(S)?: mild Have Your Spot(S) Been Treated In The Past?: has not been treated Hpi Title: Evaluation of Skin Lesions Family Member: Brother

## 2024-11-11 ENCOUNTER — OFFICE VISIT (OUTPATIENT)
Dept: URGENT CARE | Facility: CLINIC | Age: 44
End: 2024-11-11
Payer: COMMERCIAL

## 2024-11-11 VITALS
DIASTOLIC BLOOD PRESSURE: 79 MMHG | HEART RATE: 76 BPM | SYSTOLIC BLOOD PRESSURE: 108 MMHG | RESPIRATION RATE: 20 BRPM | BODY MASS INDEX: 23.46 KG/M2 | OXYGEN SATURATION: 98 % | TEMPERATURE: 98 F | HEIGHT: 66 IN | WEIGHT: 145.94 LBS

## 2024-11-11 DIAGNOSIS — J45.901 EXACERBATION OF ASTHMA, UNSPECIFIED ASTHMA SEVERITY, UNSPECIFIED WHETHER PERSISTENT: Primary | ICD-10-CM

## 2024-11-11 PROCEDURE — 99214 OFFICE O/P EST MOD 30 MIN: CPT | Mod: S$GLB,,, | Performed by: NURSE PRACTITIONER

## 2024-11-11 RX ORDER — PREDNISONE 20 MG/1
40 TABLET ORAL DAILY
Qty: 10 TABLET | Refills: 0 | Status: SHIPPED | OUTPATIENT
Start: 2024-11-11 | End: 2024-11-16

## 2024-11-11 RX ORDER — DICYCLOMINE HYDROCHLORIDE 20 MG/1
TABLET ORAL
COMMUNITY
Start: 2024-08-15

## 2024-11-11 RX ORDER — FLUTICASONE PROPIONATE AND SALMETEROL 100; 50 UG/1; UG/1
1 POWDER RESPIRATORY (INHALATION) 2 TIMES DAILY
Qty: 180 EACH | Refills: 0 | Status: SHIPPED | OUTPATIENT
Start: 2024-11-11 | End: 2025-02-09

## 2024-11-11 RX ORDER — ALBUTEROL SULFATE 90 UG/1
2 INHALANT RESPIRATORY (INHALATION) EVERY 6 HOURS PRN
Qty: 18 G | Refills: 0 | Status: SHIPPED | OUTPATIENT
Start: 2024-11-11 | End: 2025-11-11

## 2024-11-11 NOTE — PATIENT INSTRUCTIONS
Rest.  Drink plenty of fluids.  Take Tylenol and/or Ibuprofen as directed as needed for fever/pain.  Do not take more than the recommended dose.  Follow up with your PCP within the next 1-2 weeks as needed.   You must understand that you have received an Urgent Care treatment only and that you may be released before all of your medical problems are known or treated.   You, the patient, will arrange for follow up care as instructed.   If your condition worsens or fails to improve we recommend that you receive another evaluation at the ER immediately or contact your PCP to discuss your concerns.   You can call [phone] or [phone] to help schedule an appointment with the appropriate provider.

## 2024-11-11 NOTE — PROGRESS NOTES
"Subjective:      Patient ID: Tami Ludwig is a 44 y.o. female.    Vitals:  height is 5' 6" (1.676 m) and weight is 66.2 kg (145 lb 15.1 oz). Her oral temperature is 97.7 °F (36.5 °C). Her blood pressure is 108/79 and her pulse is 76. Her respiration is 20 and oxygen saturation is 98%.     Chief Complaint: Shortness of Breath    Pt presents today with  using her rescue inhaler more frequently than norm, thru out the day and waking up through the night to use more than usual debies any sinus issues as far as coughing or congestion, sx started started a week ago with needing inhaler, can hear wheezing, denies any chest congestion as well. Her O2 was at 98% when I took it.    Provider note begins here:  Patient is a 44-year-old female with a history of asthma who comes in today with complaints of shortness of breath x1 week.  She notes she has been using her albuterol inhaler more frequently.  She has been out of her Advair for about 2 months.  She does not see her PCP again until the end of December.  She reports some mild dry cough.  She denies any nasal congestion, sore throat.  She reports seeing telehealth for similar symptoms 2 months ago and asked for a refill of her Advair but for some reason the prescription would not go through.    Shortness of Breath  This is a new problem. The current episode started 1 to 4 weeks ago. The problem occurs daily. The problem has been gradually worsening. Associated symptoms include wheezing. Pertinent negatives include no abdominal pain, chest pain, ear pain, fever, headaches, hemoptysis, leg pain, leg swelling, neck pain, orthopnea, PND, rash, rhinorrhea, sore throat, sputum production, swollen glands, syncope or vomiting. Nothing aggravates the symptoms. The patient has no known risk factors for DVT/PE. She has tried nothing for the symptoms. The treatment provided no relief. Her past medical history is significant for allergies, asthma and pneumonia. There is no history " of COPD.       Constitution: Negative for fever.   HENT:  Negative for ear pain and sore throat.    Neck: Negative for neck pain.   Cardiovascular:  Negative for chest pain, leg swelling and passing out.   Respiratory:  Positive for shortness of breath and wheezing. Negative for sputum production and bloody sputum.    Gastrointestinal:  Negative for abdominal pain and vomiting.   Skin:  Negative for rash.   Neurological:  Negative for headaches.      Objective:     Physical Exam   Constitutional: She is oriented to person, place, and time. She appears well-developed. She is cooperative.  Non-toxic appearance. She does not appear ill. No distress.   HENT:   Head: Normocephalic and atraumatic.   Ears:   Right Ear: Hearing, tympanic membrane, external ear and ear canal normal.   Left Ear: Hearing, tympanic membrane, external ear and ear canal normal.   Nose: Nose normal. No mucosal edema, rhinorrhea or nasal deformity. No epistaxis. Right sinus exhibits no maxillary sinus tenderness and no frontal sinus tenderness. Left sinus exhibits no maxillary sinus tenderness and no frontal sinus tenderness.   Mouth/Throat: Uvula is midline, oropharynx is clear and moist and mucous membranes are normal. No trismus in the jaw. Normal dentition. No uvula swelling. No oropharyngeal exudate, posterior oropharyngeal edema or posterior oropharyngeal erythema.   Eyes: Conjunctivae and lids are normal. No scleral icterus.   Neck: Trachea normal and phonation normal. Neck supple. No edema present. No erythema present. No neck rigidity present.   Cardiovascular: Normal rate, regular rhythm, normal heart sounds and normal pulses.   Pulmonary/Chest: Effort normal. No stridor. No respiratory distress. She has decreased breath sounds (tight lung sounds throughout all lobes). She has no wheezes (no wheezing noted on exam, pt used albuterol inhaler about an hr ago.). She has no rhonchi. She has no rales. She exhibits no tenderness.   Abdominal:  Normal appearance.   Musculoskeletal: Normal range of motion.         General: No deformity. Normal range of motion.   Neurological: She is alert and oriented to person, place, and time. She exhibits normal muscle tone. Coordination normal.   Skin: Skin is warm, dry, intact, not diaphoretic and not pale.   Psychiatric: Her speech is normal and behavior is normal. Judgment and thought content normal.   Nursing note and vitals reviewed.      Assessment:     1. Exacerbation of asthma, unspecified asthma severity, unspecified whether persistent        Plan:       Exacerbation of asthma, unspecified asthma severity, unspecified whether persistent  -     fluticasone-salmeterol diskus inhaler 100-50 mcg; Inhale 1 puff into the lungs 2 (two) times daily. Controller  Dispense: 180 each; Refill: 0  -     albuterol (VENTOLIN HFA) 90 mcg/actuation inhaler; Inhale 2 puffs into the lungs every 6 (six) hours as needed for Wheezing. Rescue  Dispense: 18 g; Refill: 0  -     predniSONE (DELTASONE) 20 MG tablet; Take 2 tablets (40 mg total) by mouth once daily. for 5 days  Dispense: 10 tablet; Refill: 0        Her usual rx for Advair 111-21 mcg is not reimbursable. Will start her on her advair 100-50 mcg and have her follow up with PCP to see if she needs any changes.         Patient Instructions     Rest.  Drink plenty of fluids.  Take Tylenol and/or Ibuprofen as directed as needed for fever/pain.  Do not take more than the recommended dose.  Follow up with your PCP within the next 1-2 weeks as needed.   You must understand that you have received an Urgent Care treatment only and that you may be released before all of your medical problems are known or treated.   You, the patient, will arrange for follow up care as instructed.   If your condition worsens or fails to improve we recommend that you receive another evaluation at the ER immediately or contact your PCP to discuss your concerns.   You can call [phone] or [phone] to help schedule an appointment with the appropriate provider.

## 2024-12-18 NOTE — PROGRESS NOTES
[facility] Note    Chief Complaint      Chief Complaint   Patient presents with    Annual Exam       History of Present Illness      Tami Ludwig is a 44 y.o.   WF presents to fu chronic issues and transaminitis.  Last visit - 10/3/23    Note - she was prev. started on Liothyronine and TFT's were wnl at [facility]. I had instructed her to stop this which she did. We discussed dangers of overtreating the thyroid incl bone loss.    Low Co 2- CO2 was slightly low -20. Repeat level.       Asthma - Pt on Albuterol prn and Advair QAM.  Fall and winter and allergies trigger her.        GERD - Rare. She avoids triggers.      +RAEANN - Lupus panel - neg. Prev CCP and Rheumatoid factor - were both negative.  Note - fam h/o RA and Hashimoto's.     Microscopic colitis - Tx with budesonide. Doing great.      Recurrent infections - Will cont to monitor and consider Davila if needed. Tx with amoxil 2/28/22; Tx with Zmax 12/26/21; Tx for LLL Pna - Augmentin and doxy 11/30/22    Rt ovarian cyst - Pelvic u/s - 9/5/24 - Uterine fibroid is identified.Complex right ovarian cyst is noted. Follow-up is recommended. Due for repeat U/S.     Acne - Pt is on aldactone 50 mg/d. Monitor K+ and renal function. Rec adeq hydration.     H/o Transaminitis  - Resolved. acute hepatitis panel was negative and your liver enzymes were back to normal. Abd U/S - 4/14/23 - wnl     HCM - Flu - 10/23/24;  Tdap - 8/14/18;   MGM - 5/8/24 - repeat 1 yr- repeat 1 yr; at [facility];   PAP - 8/19/24- wnl - wnl;   HIV Screen - utd?; Hep C - 4/24/23; C-scope - 7/12/21 - microscopic colitis- repeat 7 yrs; Prev PCP - Dr. Jean Carlos Medrano;  Ob/GYN - Dr. Kidd; Derm - Dr. Mer Go; Rheum - Dr. Galan; well visit - 12/19/24     Patient Care Team:  Sally Corley MD as PCP - General (Internal Medicine)  Ye Kidd MD as Obstetrician (Obstetrics and Gynecology)     Health Maintenance:  Immunization History   Administered Date(s)  Administered    COVID-19, MRNA, LN-S, PF (Pfizer) (Purple Cap) 02/26/2021, 03/22/2021, 11/18/2021    HPV Quadrivalent 01/22/2009, 03/26/2009    Influenza - Quadrivalent - MDCK - PF 01/27/2018    Influenza - Quadrivalent - PF *Preferred* (6 months and older) 10/27/2016    Influenza - Trivalent - Fluarix, Flulaval, Fluzone, Afluria - PF 10/27/2016    Tdap 08/14/2018      Health Maintenance   Topic Date Due    Pneumococcal Vaccines (Age 0-64) (1 of 2 - PCV) Never done    HIV Screening  Never done    COVID-19 Vaccine (4 - 2024-25 season) 09/01/2024    Mammogram  05/08/2025    TETANUS VACCINE  08/14/2028    Cervical Cancer Screening  08/19/2029    RSV Vaccine (Age 60+ and Pregnant patients) (1 - 1-dose 75+ series) 11/03/2057    Hepatitis C Screening  Completed    Influenza Vaccine  Completed    Lipid Panel  Completed        Past Medical History:  Past Medical History:   Diagnosis Date    Asthma     Chronic diarrhea     COVID-19 04/04/2023    Microscopic colitis     Pneumonia, unspecified organism 11/30/2022       Past Surgical History:   has a past surgical history that includes Augmentation of breast; Appendectomy; Colonoscopy (N/A, 07/12/2021); and Breast surgery (December 2006).    Family History:  family history includes Arthritis in her sister; Cancer (age of onset: 66) in her father; Crohn's disease in her father; Hashimoto's thyroiditis in her sister; Hypertension in her father and mother; Kidney disease in her maternal grandmother; Raynaud syndrome in her sister; Rheum arthritis in her father, paternal grandfather, and sister; Stroke in her paternal grandfather; Thyroid disease in her sister.     Social History:  Social History     Tobacco Use    Smoking status: Never     Passive exposure: Never    Smokeless tobacco: Never   Substance Use Topics    Alcohol use: Yes     Alcohol/week: 2.0 standard drinks of alcohol     Types: 2 Drinks containing 0.5 oz of alcohol per week    Drug use: Never       Review of Systems  "  Constitutional:  Negative for activity change and unexpected weight change.   HENT:  Negative for hearing loss, rhinorrhea and trouble swallowing.    Eyes:  Negative for discharge and visual disturbance.   Respiratory:  Negative for cough, chest tightness, shortness of breath and wheezing.    Cardiovascular:  Negative for chest pain and palpitations.   Gastrointestinal:  Positive for diarrhea. Negative for blood in stool, constipation and vomiting.   Endocrine: Negative for polydipsia and polyuria.   Genitourinary:  Negative for difficulty urinating, dysuria, hematuria and menstrual problem.   Musculoskeletal:  Negative for arthralgias, joint swelling and neck pain.   Neurological:  Negative for weakness and headaches.   Psychiatric/Behavioral:  Negative for confusion and dysphoric mood.         Medications:    Current Outpatient Medications:     cetirizine (ZYRTEC) 10 MG tablet, Take 10 mg by mouth once daily., Disp: , Rfl:     albuterol (PROVENTIL) 2.5 mg /3 mL (0.083 %) nebulizer solution, Take 3 mLs (2.5 mg total) by nebulization every 6 (six) hours as needed for Wheezing. Rescue, Disp: 45 mL, Rfl: 0    albuterol (VENTOLIN HFA) 90 mcg/actuation inhaler, Inhale 2 puffs into the lungs every 6 (six) hours as needed for Wheezing. Rescue, Disp: 18 g, Rfl: 5    [START ON 1/31/2025] fluticasone-salmeterol diskus inhaler 100-50 mcg, Inhale 1 puff into the lungs 2 (two) times daily. Controller, Disp: 180 each, Rfl: 3    spironolactone (ALDACTONE) 100 MG tablet, 1/2 tab daily, Disp: 1 tablet, Rfl: 0     Allergies:  Review of patient's allergies indicates:  No Known Allergies    Physical Exam      Vital Signs  Temp: 98.1 °F (36.7 °C)  Temp Source: Temporal  Pulse: 66  Resp: 18  SpO2: 98 %  BP: 120/80  BP Location: Left arm  Patient Position: Sitting  Pain Score: 0-No pain  Height and Weight  Height: 5' 6" (167.6 cm)  Weight: 67.1 kg (147 lb 14.9 oz)  BSA (Calculated - sq m): 1.77 sq meters  BMI (Calculated): 23.9  Weight " in (lb) to have BMI = 25: 154.6      Patient Position: Sitting      Physical Exam  Vitals reviewed.   Constitutional:       General: She is not in acute distress.     Appearance: Normal appearance. She is not ill-appearing, toxic-appearing or diaphoretic.   HENT:      Head: Normocephalic and atraumatic.      Right Ear: Tympanic membrane normal.      Left Ear: Tympanic membrane normal.      Mouth/Throat:      Mouth: Mucous membranes are moist.   Eyes:      Extraocular Movements: Extraocular movements intact.      Conjunctiva/sclera: Conjunctivae normal.      Pupils: Pupils are equal, round, and reactive to light.   Neck:      Vascular: No carotid bruit.   Cardiovascular:      Rate and Rhythm: Normal rate and regular rhythm.      Pulses: Normal pulses.      Heart sounds: Normal heart sounds. No murmur heard.  Pulmonary:      Effort: No respiratory distress.      Breath sounds: Normal breath sounds. No wheezing.   Abdominal:      General: Bowel sounds are normal. There is no distension.      Palpations: Abdomen is soft.      Tenderness: There is no abdominal tenderness. There is no guarding or rebound.   Musculoskeletal:         General: Normal range of motion.   Skin:     General: Skin is warm.   Neurological:      General: No focal deficit present.      Mental Status: She is alert and oriented to person, place, and time.   Psychiatric:         Mood and Affect: Mood normal.         Behavior: Behavior normal.          Laboratory:  CBC:  Recent Labs   Lab 07/27/22  1019 10/04/23  0730   WBC 8.07 8.08   RBC 4.71 4.47   Hemoglobin 14.3 13.8   Hematocrit 43.6 41.4   Platelets 408 335   MCV 93 93   MCH 30.4 30.9   MCHC 32.8 33.3       CMP:  Recent Labs   Lab 07/27/22  1019 04/24/23  1154 10/04/23  0730   Glucose 93  --  85   Calcium 9.8  --  9.0   Albumin 4.3 4.4 3.8   Total Protein 7.4 7.4 6.6   Sodium 137  --  138   Potassium 4.2  --  4.2   CO2 27  --  20 L   Chloride 103  --  108   BUN 10  --  8   Creatinine 0.8  --  0.7    Alkaline Phosphatase 61 59 52 L   ALT 74 H 32 17   AST 41 H 24 17   Total Bilirubin 0.6 0.6 0.5           URINALYSIS:  Recent Labs   Lab 08/15/24  1651   Color, POC UA Yellow   Clarity, POC UA Clear   Spec Grav POC UA 1.025   pH, POC UA 6.0   Nitrite, POC UA Negative   Urobilinogen, POC UA 0.2        LIPIDS:  Recent Labs   Lab 07/27/22  1019 10/04/23  0730   TSH 1.526 1.227   HDL 78 H 60   Cholesterol 193 150   Triglycerides 61 48   LDL Cholesterol 102.8 80.4   HDL/Cholesterol Ratio 40.4 40.0   Non-HDL Cholesterol 115 90   Total Cholesterol/HDL Ratio 2.5 2.5       TSH:  Recent Labs   Lab 07/27/22  1019 10/04/23  0730   TSH 1.526 1.227       A1C:  Recent Labs   Lab 07/27/22  1019   Hemoglobin A1C 4.9            Recent Labs   Lab 04/24/23  1154   Hepatitis C Ab Non-reactive       Assessment/Plan     Tami Ludwig is a 44 y.o.female with:    Normal physical exam, routine  -     Comprehensive Metabolic Panel; Future; Expected date: 12/19/2024  -     CBC Auto Differential; Future; Expected date: 12/19/2024  -     TSH; Future; Expected date: 12/19/2024  - Performed today.  Will check Basic labs.  RTC in 1 yr for fu or sooner if needed    Exacerbation of asthma, unspecified asthma severity, unspecified whether persistent  -     fluticasone-salmeterol diskus inhaler 100-50 mcg; Inhale 1 puff into the lungs 2 (two) times daily. Controller  Dispense: 180 each; Refill: 3  -     albuterol (VENTOLIN HFA) 90 mcg/actuation inhaler; Inhale 2 puffs into the lungs every 6 (six) hours as needed for Wheezing. Rescue  Dispense: 18 g; Refill: 5  - Stable.  Cont current regimen.    Complex cyst of right ovary  - Due for repeat U/S with OB.     Screening for lipoid disorders  -     Lipid Panel; Future; Expected date: 12/19/2024    Chronic conditions status updated as per HPI.  Other than changes above, cont current medications and maintain follow up with specialists.   Follow up in about 1 year (around 12/20/2025) for well visit or  sooner if needed.      Sally Corley MD  [facility]

## 2024-12-19 ENCOUNTER — OFFICE VISIT (OUTPATIENT)
Dept: PRIMARY CARE CLINIC | Facility: CLINIC | Age: 44
End: 2024-12-19
Payer: COMMERCIAL

## 2024-12-19 ENCOUNTER — LAB VISIT (OUTPATIENT)
Dept: LAB | Facility: HOSPITAL | Age: 44
End: 2024-12-19
Attending: INTERNAL MEDICINE
Payer: COMMERCIAL

## 2024-12-19 VITALS
TEMPERATURE: 98 F | HEIGHT: 66 IN | WEIGHT: 147.94 LBS | SYSTOLIC BLOOD PRESSURE: 120 MMHG | BODY MASS INDEX: 23.77 KG/M2 | OXYGEN SATURATION: 98 % | HEART RATE: 66 BPM | RESPIRATION RATE: 18 BRPM | DIASTOLIC BLOOD PRESSURE: 80 MMHG

## 2024-12-19 DIAGNOSIS — J45.901 EXACERBATION OF ASTHMA, UNSPECIFIED ASTHMA SEVERITY, UNSPECIFIED WHETHER PERSISTENT: ICD-10-CM

## 2024-12-19 DIAGNOSIS — Z13.220 SCREENING FOR LIPOID DISORDERS: ICD-10-CM

## 2024-12-19 DIAGNOSIS — Z00.00 NORMAL PHYSICAL EXAM, ROUTINE: Primary | ICD-10-CM

## 2024-12-19 DIAGNOSIS — R74.01 TRANSAMINITIS: Primary | ICD-10-CM

## 2024-12-19 DIAGNOSIS — Z00.00 NORMAL PHYSICAL EXAM, ROUTINE: ICD-10-CM

## 2024-12-19 DIAGNOSIS — N83.291 COMPLEX CYST OF RIGHT OVARY: ICD-10-CM

## 2024-12-19 LAB
ALBUMIN SERPL BCP-MCNC: 4.3 G/DL (ref 3.5–5.2)
ALP SERPL-CCNC: 57 U/L (ref 40–150)
ALT SERPL W/O P-5'-P-CCNC: 152 U/L (ref 10–44)
ANION GAP SERPL CALC-SCNC: 9 MMOL/L (ref 8–16)
AST SERPL-CCNC: 93 U/L (ref 10–40)
BASOPHILS # BLD AUTO: 0.04 K/UL (ref 0–0.2)
BASOPHILS NFR BLD: 0.4 % (ref 0–1.9)
BILIRUB SERPL-MCNC: 0.6 MG/DL (ref 0.1–1)
BUN SERPL-MCNC: 10 MG/DL (ref 6–20)
CALCIUM SERPL-MCNC: 9.8 MG/DL (ref 8.7–10.5)
CHLORIDE SERPL-SCNC: 107 MMOL/L (ref 95–110)
CHOLEST SERPL-MCNC: 179 MG/DL (ref 120–199)
CHOLEST/HDLC SERPL: 2.7 {RATIO} (ref 2–5)
CO2 SERPL-SCNC: 21 MMOL/L (ref 23–29)
CREAT SERPL-MCNC: 0.8 MG/DL (ref 0.5–1.4)
DIFFERENTIAL METHOD BLD: NORMAL
EOSINOPHIL # BLD AUTO: 0.3 K/UL (ref 0–0.5)
EOSINOPHIL NFR BLD: 3.7 % (ref 0–8)
ERYTHROCYTE [DISTWIDTH] IN BLOOD BY AUTOMATED COUNT: 12.9 % (ref 11.5–14.5)
EST. GFR  (NO RACE VARIABLE): >60 ML/MIN/1.73 M^2
GLUCOSE SERPL-MCNC: 79 MG/DL (ref 70–110)
HCT VFR BLD AUTO: 43.4 % (ref 37–48.5)
HDLC SERPL-MCNC: 66 MG/DL (ref 40–75)
HDLC SERPL: 36.9 % (ref 20–50)
HGB BLD-MCNC: 14.2 G/DL (ref 12–16)
IMM GRANULOCYTES # BLD AUTO: 0.04 K/UL (ref 0–0.04)
IMM GRANULOCYTES NFR BLD AUTO: 0.4 % (ref 0–0.5)
LDLC SERPL CALC-MCNC: 102.8 MG/DL (ref 63–159)
LYMPHOCYTES # BLD AUTO: 1.7 K/UL (ref 1–4.8)
LYMPHOCYTES NFR BLD: 18.4 % (ref 18–48)
MCH RBC QN AUTO: 30.5 PG (ref 27–31)
MCHC RBC AUTO-ENTMCNC: 32.7 G/DL (ref 32–36)
MCV RBC AUTO: 93 FL (ref 82–98)
MONOCYTES # BLD AUTO: 0.8 K/UL (ref 0.3–1)
MONOCYTES NFR BLD: 8.4 % (ref 4–15)
NEUTROPHILS # BLD AUTO: 6.3 K/UL (ref 1.8–7.7)
NEUTROPHILS NFR BLD: 68.7 % (ref 38–73)
NONHDLC SERPL-MCNC: 113 MG/DL
NRBC BLD-RTO: 0 /100 WBC
PLATELET # BLD AUTO: 338 K/UL (ref 150–450)
PMV BLD AUTO: 10.8 FL (ref 9.2–12.9)
POTASSIUM SERPL-SCNC: 4.3 MMOL/L (ref 3.5–5.1)
PROT SERPL-MCNC: 7.5 G/DL (ref 6–8.4)
RBC # BLD AUTO: 4.65 M/UL (ref 4–5.4)
SODIUM SERPL-SCNC: 137 MMOL/L (ref 136–145)
TRIGL SERPL-MCNC: 51 MG/DL (ref 30–150)
TSH SERPL DL<=0.005 MIU/L-ACNC: 1.1 UIU/ML (ref 0.4–4)
WBC # BLD AUTO: 9.24 K/UL (ref 3.9–12.7)

## 2024-12-19 PROCEDURE — 1160F RVW MEDS BY RX/DR IN RCRD: CPT | Mod: CPTII,S$GLB,, | Performed by: INTERNAL MEDICINE

## 2024-12-19 PROCEDURE — 3008F BODY MASS INDEX DOCD: CPT | Mod: CPTII,S$GLB,, | Performed by: INTERNAL MEDICINE

## 2024-12-19 PROCEDURE — 99396 PREV VISIT EST AGE 40-64: CPT | Mod: S$GLB,,, | Performed by: INTERNAL MEDICINE

## 2024-12-19 PROCEDURE — 99999 PR PBB SHADOW E&M-EST. PATIENT-LVL III: CPT | Mod: PBBFAC,,, | Performed by: INTERNAL MEDICINE

## 2024-12-19 PROCEDURE — 3079F DIAST BP 80-89 MM HG: CPT | Mod: CPTII,S$GLB,, | Performed by: INTERNAL MEDICINE

## 2024-12-19 PROCEDURE — 36415 COLL VENOUS BLD VENIPUNCTURE: CPT | Mod: PN | Performed by: INTERNAL MEDICINE

## 2024-12-19 PROCEDURE — 80053 COMPREHEN METABOLIC PANEL: CPT | Performed by: INTERNAL MEDICINE

## 2024-12-19 PROCEDURE — 84443 ASSAY THYROID STIM HORMONE: CPT | Performed by: INTERNAL MEDICINE

## 2024-12-19 PROCEDURE — 1159F MED LIST DOCD IN RCRD: CPT | Mod: CPTII,S$GLB,, | Performed by: INTERNAL MEDICINE

## 2024-12-19 PROCEDURE — 80061 LIPID PANEL: CPT | Performed by: INTERNAL MEDICINE

## 2024-12-19 PROCEDURE — 3074F SYST BP LT 130 MM HG: CPT | Mod: CPTII,S$GLB,, | Performed by: INTERNAL MEDICINE

## 2024-12-19 PROCEDURE — 85025 COMPLETE CBC W/AUTO DIFF WBC: CPT | Performed by: INTERNAL MEDICINE

## 2024-12-19 RX ORDER — FLUTICASONE PROPIONATE AND SALMETEROL 100; 50 UG/1; UG/1
1 POWDER RESPIRATORY (INHALATION) 2 TIMES DAILY
Qty: 180 EACH | Refills: 3 | Status: SHIPPED | OUTPATIENT
Start: 2025-01-31

## 2024-12-19 RX ORDER — ALBUTEROL SULFATE 90 UG/1
2 INHALANT RESPIRATORY (INHALATION) EVERY 6 HOURS PRN
Qty: 18 G | Refills: 5 | Status: SHIPPED | OUTPATIENT
Start: 2024-12-19 | End: 2025-12-19

## 2024-12-19 RX ORDER — SPIRONOLACTONE 100 MG/1
TABLET, FILM COATED ORAL
Qty: 1 TABLET | Refills: 0
Start: 2024-12-19

## 2024-12-20 NOTE — PROGRESS NOTES
I sent pt a my chart message -  I reviewed your labs.  Your thyroid functions are normal.  Your Cholesterol looked good.  Your kidney function  looked good.  Your liver enzymes were elevated again.  I don't have a reason for this.  We previously checked your hepatitis panel and a liver ultrasound which were normal.  I would like to refer you to hepatology to look for other reasons for this elevation.  You are not anemic. No further recommendations at this time.  Dr. HAM

## 2025-03-28 ENCOUNTER — OFFICE VISIT (OUTPATIENT)
Dept: HEPATOLOGY | Facility: CLINIC | Age: 45
End: 2025-03-28
Payer: COMMERCIAL

## 2025-03-28 DIAGNOSIS — R74.01 TRANSAMINITIS: Primary | ICD-10-CM

## 2025-03-28 RX ORDER — CHOLECALCIFEROL (VITAMIN D3) 25 MCG
1000 TABLET ORAL DAILY
COMMUNITY

## 2025-03-28 NOTE — PATIENT INSTRUCTIONS
1. Fibroscan to look for fat or scar tissue in the liver with return to clinic   2. Recommend the Hep A/B combination vaccine if you have not completed it in the past, see below  3. Labs before return to clinic to  check for multiple causes of liver disease. These labs will release to you as soon as they are resulted but we will discuss them in detail at your upcoming visit to discuss what the lab results mean.   4.  Follow up based on labs and uS

## 2025-03-28 NOTE — PROGRESS NOTES
The patient location is: [place]  The chief complaint leading to consultation is: see below    Visit type: audiovisual    Face to Face time with patient: 30 minutes of total time spent on the encounter, which includes face to face time and non-face to face time preparing to see the patient (eg, review of tests), Obtaining and/or reviewing separately obtained history, Documenting clinical information in the electronic or other health record, Independently interpreting results (not separately reported) and communicating results to the patient/family/caregiver, or Care coordination (not separately reported).         Each patient to whom he or she provides medical services by telemedicine is:  (1) informed of the relationship between the physician and patient and the respective role of any other health care provider with respect to management of the patient; and (2) notified that he or she may decline to receive medical services by telemedicine and may withdraw from such care at any time.    Notes:      [facility] VISIT NEW PT NOTE    REFERRING PROVIDER:  No ref. provider found  PCP: Sally Corley MD     CHIEF COMPLAINT: elevated liver enzymes, previously + RAEANN    HPI: This is a 44 y.o. patient with PMH noted below, presenting for evaluation of above    Previous serologic w/u - will obtain limited sero w/u    Prior serologic workup:   Lab Results   Component Value Date    ANASCREEN Positive (A) 05/31/2021    HEPBSAG Non-reactive 04/24/2023    HEPCAB Non-reactive 04/24/2023    HEPAIGM Non-reactive 04/24/2023       Liver fibrosis staging:  -- will reassess after repeat labs       Interval HPI: Presents today alone via video visit.   Possible URI around 11/2024, before elevated liver enzymes  No herbal meds  Family history of autoimmune disorders: Hashimotos in sister, dad and sister with RA    Labs done 12/2024 show elevated transaminase levels x1, previously mostly chronically normal      Lab Results    Component Value Date     (H) 12/19/2024    AST 93 (H) 12/19/2024    ALKPHOS 57 12/19/2024    BILITOT 0.6 12/19/2024    ALBUMIN 4.3 12/19/2024     12/19/2024       Abd U/S done in 2023 showed normal liver - will repeat     Denies family history of liver disease . Denies significant alcohol consumption currently or in the past-   Social History     Substance and Sexual Activity   Alcohol Use Not Currently    Comment: 1-2 servings per week at most                 Allergy and medication list reviewed and updated     PMHX:  has a past medical history of Asthma, Chronic diarrhea, COVID-19 (04/04/2023), Microscopic colitis, and Pneumonia, unspecified organism (11/30/2022).    PSHX:  has a past surgical history that includes Augmentation of breast; Appendectomy; Colonoscopy (N/A, 07/12/2021); and Breast surgery (December 2006).    FAMILY HISTORY: Updated and reviewed in EPIC    ROS:   GENERAL: Denies fatigue  CARDIOVASCULAR: Denies edema  GI: Denies abdominal pain  SKIN: Denies rash, itching   NEURO: Denies confusion, memory loss, or mood changes    PHYSICAL EXAM:   In no acute distress; alert and oriented to person, place and time  VITALS: There were no vitals taken for this visit.  EYES: Sclerae anicteric  GI: Soft, non-tender, non-distended. No ascites.  EXTREMITIES:  No edema.  SKIN: Warm and dry. No jaundice. No telangectasias noted. No palmar erythema.  NEURO:  No asterixis.  PSYCH:  Thought and speech pattern appropriate. Behavior normal      EDUCATION:  See instructions discussed with patient in Instructions section of the After Visit Summary     ASSESSMENT & PLAN:  44 y.o. female with:  1. Elevated liver enzymes   -- Labs note elevated transaminase levels x1 in December, possibly around time of URI, will repeat   --- synthetic liver function WNL  --- Abd US to be repeated given elevated liver enzymes   -- do not suspect any medications contributing   --- previous serological work up : will obtain  limited sero w/u and repeat labs. IF remains elevated, will complete w/u  --- Hep A and B immunity: see HPI  -- labs and US soon   Orders Placed This Encounter   Procedures    US Abdomen Complete    Hepatitis Panel, Acute    Hepatic Function Panel    Hepatitis B Core Antibody, Total              Follow up for pending results of workup.based on US and lab results   Orders Placed This Encounter   Procedures    US Abdomen Complete    Hepatitis Panel, Acute    Hepatic Function Panel    Hepatitis B Core Antibody, Total        Thank you for allowing me to participate in the care of MARK Varghese    I spent a total of 30 minutes on the day of the visit.This includes face to face time and non-face to face time preparing to see the patient (eg, review of tests), obtaining and/or reviewing separately obtained history, documenting clinical information in the electronic or other health record, independently interpreting results and communicating results to the patient/family/caregiver, and coordinating care.         CC'ed note to:   Sally Corley MD

## 2025-03-31 ENCOUNTER — HOSPITAL ENCOUNTER (OUTPATIENT)
Dept: RADIOLOGY | Facility: HOSPITAL | Age: 45
Discharge: HOME | End: 2025-03-31
Attending: NURSE PRACTITIONER
Payer: COMMERCIAL

## 2025-03-31 DIAGNOSIS — R74.01 TRANSAMINITIS: ICD-10-CM

## 2025-03-31 PROCEDURE — 76700 US EXAM ABDOM COMPLETE: CPT | Mod: 26,,, | Performed by: INTERNAL MEDICINE

## 2025-03-31 PROCEDURE — 76700 US EXAM ABDOM COMPLETE: CPT | Mod: TC

## 2025-04-01 ENCOUNTER — RESULTS FOLLOW-UP (OUTPATIENT)
Dept: HEPATOLOGY | Facility: CLINIC | Age: 45
End: 2025-04-01

## 2025-06-13 ENCOUNTER — ON-DEMAND VIRTUAL (OUTPATIENT)
Dept: URGENT CARE | Facility: CLINIC | Age: 45
End: 2025-06-13
Payer: COMMERCIAL

## 2025-06-13 DIAGNOSIS — R06.2 WHEEZING: ICD-10-CM

## 2025-06-13 DIAGNOSIS — J45.21 MILD INTERMITTENT ASTHMA WITH EXACERBATION: ICD-10-CM

## 2025-06-13 DIAGNOSIS — R05.9 COUGH, UNSPECIFIED TYPE: Primary | ICD-10-CM

## 2025-06-13 RX ORDER — BENZONATATE 100 MG/1
100 CAPSULE ORAL 3 TIMES DAILY PRN
Qty: 60 CAPSULE | Refills: 0 | Status: SHIPPED | OUTPATIENT
Start: 2025-06-13 | End: 2025-07-03

## 2025-06-13 RX ORDER — PREDNISONE 20 MG/1
20 TABLET ORAL DAILY
Qty: 5 TABLET | Refills: 0 | Status: SHIPPED | OUTPATIENT
Start: 2025-06-13 | End: 2025-06-18

## 2025-06-13 NOTE — PATIENT INSTRUCTIONS
Faxed discharge information via manual fax machine to facility.    OVER THE COUNTER RECOMMENDATIONS/SUGGESTIONS (IF NO CONTRAINDICATIONS).     ·         Make sure to stay well hydrated.     ·         Use Nasal Saline to mechanically move any post nasal drip from your eustachian tube or from the back of your throat.     ·         Use warm saltwater gargles to ease your throat pain. Warm saltwater gargles as needed for sore throat-  1/2 tsp salt to 1 cup warm water, gargle as desired. Warm fluids tend to relieve a sore throat.     .         Throat lozenges, Chloraseptic spray or other over the counter treatments are ok to use as well. Use as directed.     ·         Use an antihistamine such as Claritin, Zyrtec or Allegra to dry you out.     ·         Use pseudoephedrine (behind the counter) to decongest. Pseudoephedrine  30 mg up to 240 mg /day. It can raise your blood pressure and give you palpitations.     ·         Use Mucinex (guaifenesin) to break up mucous up to 2400mg/day to loosen any mucous.     ·         The Mucinex DM pill has a cough suppressant that can be sedating. It can be used at night to stop the tickle at the back of your throat.     ·         You can use Mucinex D (it has guaifenesin and a high dose of pseudoephedrine) in the mornings to help decongest.     ·         Use Afrin (oxymetazoline) in each nare for no longer than 3 days, as it is addictive. It can also dry out your mucous membranes and cause elevated blood pressure. This is especially useful if you are flying.     ·         Use Flonase 1-2 sprays/nostril per day. It is a local acting steroid nasal spray, if you develop a bloody nose, stop using the medication immediately.     ·         Sometimes Nyquil at night is beneficial to help you get some rest, however it is sedating, and it does have an antihistamine, and Tylenol.     ·         Honey is a natural cough suppressant that can be used.     ·         Tylenol up to 4,000 mg a day is safe for short periods and can be used for body aches, pain, and  fever. However, in high doses and prolonged use it can cause liver irritation.     ·         Ibuprofen is a non-steroidal anti-inflammatory that can be used for body aches, pain, and fever. However, it can also cause stomach irritation if overused.

## 2025-06-13 NOTE — PROGRESS NOTES
Subjective:      Patient ID: Tami Ludwig is a 44 y.o. female.    Vitals:  vitals were not taken for this visit.     Chief Complaint: Cough and Wheezing      Visit Type: TELE AUDIOVISUAL    Patient Location: Home     Present with the patient at the time of consultation: TELEMED PRESENT WITH PATIENT: None    Past Medical History:   Diagnosis Date    Asthma     Chronic diarrhea     COVID-19 04/04/2023    Microscopic colitis     Pneumonia, unspecified organism 11/30/2022     Past Surgical History:   Procedure Laterality Date    APPENDECTOMY      AUGMENTATION OF BREAST      BREAST SURGERY  December 2006    COLONOSCOPY N/A 07/12/2021    Procedure: COLONOSCOPY;  Surgeon: Raffaele Trotter MD;  Location: [facility] ([address] [place] [address]);  Service: Endoscopy;  Laterality: N/A;  Fully vacc Covid-19 (will upload to record or bring card) - pg     Review of patient's allergies indicates:  No Known Allergies  Medications Ordered Prior to Encounter[1]  Family History   Problem Relation Name Age of Onset    Hypertension Mother Bree Matthews     Rheum arthritis Father Eddie Matthews     Cancer Father Eddie Matthews 66        Oral    Hypertension Father Eddie Matthews     Crohn's disease Father Eddie Matthews     Thyroid disease Sister Zahra Tavai     Rheum arthritis Sister Zahra Tavai     Hashimoto's thyroiditis Sister Zahra Tavai     Raynaud syndrome Sister Zahra Tavai     Arthritis Sister Zahra Tavai         rheumatoid    Kidney disease Maternal Grandmother Swapna Mendez     Stroke Paternal Grandfather Nico Vanessa     Rheum arthritis Paternal Grandfather Nico Mendez        Medications Ordered                [id number] - [place] - [address] AT [facility]   [address] [address]    Telephone: [phone]   Fax: [phone]   Hours: Open 24 hours                         E-Prescribed (2 of 2)              benzonatate (TESSALON) 100 MG capsule    Sig: Take 1 capsule (100 mg total) by  mouth 3 (three) times daily as needed for Cough. May take 1-2 caps 3 times daily as needed.       Start: 6/13/25     Quantity: 60 capsule Refills: 0                         predniSONE (DELTASONE) 20 MG tablet    Sig: Take 1 tablet (20 mg total) by mouth once daily. for 5 days       Start: 6/13/25     Quantity: 5 tablet Refills: 0                           No questionnaires on file.    Chart review shows hx of asthma. Believes to be having a flare. + cough and wheezing for 5 days. Using inhaler more frequently. Taking Zyrtec as well. No respiratory distress. Seeking further treatment options.    Cough  Associated symptoms include wheezing. Pertinent negatives include no chills, ear pain, fever, headaches, postnasal drip or sore throat.   Wheezing   Associated symptoms include coughing and sputum production (clear). Pertinent negatives include no chills, ear pain, fever, headaches or sore throat.       Constitution: Negative for chills and fever.   HENT:  Negative for ear pain, congestion, postnasal drip and sore throat.    Respiratory:  Positive for cough, sputum production (clear), wheezing and asthma.    Allergic/Immunologic: Positive for seasonal allergies and asthma.   Neurological:  Negative for headaches.        Objective:   The physical exam was conducted virtually.  Physical Exam   Constitutional: She is oriented to person, place, and time. She does not appear ill. No distress.   HENT:   Head: Normocephalic and atraumatic.   Nose: Nose normal.   Eyes: Extraocular movement intact   Pulmonary/Chest: Effort normal.   Abdominal: Normal appearance.   Musculoskeletal: Normal range of motion.         General: Normal range of motion.   Neurological: no focal deficit. She is alert and oriented to person, place, and time.   Psychiatric: Her behavior is normal. Mood normal.   Vitals reviewed.      Assessment:     1. Cough, unspecified type    2. Wheezing    3. Mild intermittent asthma with exacerbation        Plan:    Patient encouraged to monitor symptoms closely and instructed to follow-up for new or worsening symptoms. Further, in-person, evaluation may be necessary for continued treatment. Please follow up with your primary care doctor or specialist as needed. Verbally discussed plan. Patient confirms understanding and is in agreement with treatment and plan.     You must understand that you've received a Virtual Care evaluation only and that you may be released before all your medical problems are known or treated. You, the patient, will arrange for follow up care as instructed.      Cough, unspecified type  -     benzonatate (TESSALON) 100 MG capsule; Take 1 capsule (100 mg total) by mouth 3 (three) times daily as needed for Cough. May take 1-2 caps 3 times daily as needed.  Dispense: 60 capsule; Refill: 0    Wheezing  -     predniSONE (DELTASONE) 20 MG tablet; Take 1 tablet (20 mg total) by mouth once daily. for 5 days  Dispense: 5 tablet; Refill: 0    Mild intermittent asthma with exacerbation  -     predniSONE (DELTASONE) 20 MG tablet; Take 1 tablet (20 mg total) by mouth once daily. for 5 days  Dispense: 5 tablet; Refill: 0      Patient Instructions   OVER THE COUNTER RECOMMENDATIONS/SUGGESTIONS (IF NO CONTRAINDICATIONS).     ·         Make sure to stay well hydrated.     ·         Use Nasal Saline to mechanically move any post nasal drip from your eustachian tube or from the back of your throat.     ·         Use warm saltwater gargles to ease your throat pain. Warm saltwater gargles as needed for sore throat-  1/2 tsp salt to 1 cup warm water, gargle as desired. Warm fluids tend to relieve a sore throat.     .         Throat lozenges, Chloraseptic spray or other over the counter treatments are ok to use as well. Use as directed.     ·         Use an antihistamine such as Claritin, Zyrtec or Allegra to dry you out.     ·         Use pseudoephedrine (behind the counter) to decongest. Pseudoephedrine  30 mg up to 240  mg /day. It can raise your blood pressure and give you palpitations.     ·         Use Mucinex (guaifenesin) to break up mucous up to 2400mg/day to loosen any mucous.     ·         The Mucinex DM pill has a cough suppressant that can be sedating. It can be used at night to stop the tickle at the back of your throat.     ·         You can use Mucinex D (it has guaifenesin and a high dose of pseudoephedrine) in the mornings to help decongest.     ·         Use Afrin (oxymetazoline) in each nare for no longer than 3 days, as it is addictive. It can also dry out your mucous membranes and cause elevated blood pressure. This is especially useful if you are flying.     ·         Use Flonase 1-2 sprays/nostril per day. It is a local acting steroid nasal spray, if you develop a bloody nose, stop using the medication immediately.     ·         Sometimes Nyquil at night is beneficial to help you get some rest, however it is sedating, and it does have an antihistamine, and Tylenol.     ·         Honey is a natural cough suppressant that can be used.     ·         Tylenol up to 4,000 mg a day is safe for short periods and can be used for body aches, pain, and fever. However, in high doses and prolonged use it can cause liver irritation.     ·         Ibuprofen is a non-steroidal anti-inflammatory that can be used for body aches, pain, and fever. However, it can also cause stomach irritation if overused.                             [1]   Current Outpatient Medications on File Prior to Visit   Medication Sig Dispense Refill    albuterol (PROVENTIL) 2.5 mg /3 mL (0.083 %) nebulizer solution Take 3 mLs (2.5 mg total) by nebulization every 6 (six) hours as needed for Wheezing. Rescue 45 mL 0    albuterol (VENTOLIN HFA) 90 mcg/actuation inhaler Inhale 2 puffs into the lungs every 6 (six) hours as needed for Wheezing. Rescue 18 g 5    ascorbic acid, vitamin C, (VITAMIN C) 100 MG tablet Take 100 mg by mouth once daily.      cetirizine  (ZYRTEC) 10 MG tablet Take 10 mg by mouth once daily.      fluticasone-salmeterol diskus inhaler 100-50 mcg Inhale 1 puff into the lungs 2 (two) times daily. Controller 180 each 3    spironolactone (ALDACTONE) 100 MG tablet 1/2 tab daily 1 tablet 0    vitamin D (VITAMIN D3) 1000 units Tab Take 1,000 Units by mouth once daily.       No current facility-administered medications on file prior to visit.

## 2025-07-16 ENCOUNTER — ON-DEMAND VIRTUAL (OUTPATIENT)
Dept: URGENT CARE | Facility: CLINIC | Age: 45
End: 2025-07-16
Payer: COMMERCIAL

## 2025-07-16 DIAGNOSIS — J01.00 ACUTE MAXILLARY SINUSITIS, RECURRENCE NOT SPECIFIED: Primary | ICD-10-CM

## 2025-07-16 DIAGNOSIS — R49.0 HOARSENESS OF VOICE: ICD-10-CM

## 2025-07-16 RX ORDER — PREDNISONE 20 MG/1
20 TABLET ORAL DAILY
Qty: 5 TABLET | Refills: 0 | Status: SHIPPED | OUTPATIENT
Start: 2025-07-16 | End: 2025-07-21

## 2025-07-16 RX ORDER — FLUTICASONE PROPIONATE 50 MCG
1 SPRAY, SUSPENSION (ML) NASAL DAILY
Qty: 15.8 ML | Refills: 0 | Status: SHIPPED | OUTPATIENT
Start: 2025-07-16

## 2025-07-16 RX ORDER — AMOXICILLIN AND CLAVULANATE POTASSIUM 875; 125 MG/1; MG/1
1 TABLET, FILM COATED ORAL EVERY 12 HOURS
Qty: 14 TABLET | Refills: 0 | Status: SHIPPED | OUTPATIENT
Start: 2025-07-16 | End: 2025-07-23

## 2025-07-16 NOTE — PROGRESS NOTES
Subjective:      Patient ID: Tami Ludwig is a 44 y.o. female.    Vitals:  vitals were not taken for this visit.     Chief Complaint: Sinus Problem      Visit Type: TELE AUDIOVISUAL - This visit was conducted virtually based on  subjective information and limited objective exam    Present with the patient at the time of consultation: TELEMED PRESENT WITH PATIENT: None  LOCATION OF PATIENT [facility]  Two patient identifiers used to verify patient- saying out date of birth and full name.       Past Medical History:   Diagnosis Date    Asthma     Chronic diarrhea     COVID-19 04/04/2023    Microscopic colitis     Pneumonia, unspecified organism 11/30/2022     Past Surgical History:   Procedure Laterality Date    APPENDECTOMY      AUGMENTATION OF BREAST      BREAST SURGERY  December 2006    COLONOSCOPY N/A 07/12/2021    Procedure: COLONOSCOPY;  Surgeon: Raffaele Trotter MD;  Location: [facility] ([address] [place] [address]);  Service: Endoscopy;  Laterality: N/A;  Fully vacc Covid-19 (will upload to record or bring card) - pg     Review of patient's allergies indicates:  No Known Allergies  Medications Ordered Prior to Encounter[1]  Family History   Problem Relation Name Age of Onset    Hypertension Mother Bree Matthews     Rheum arthritis Father Eddie Matthews     Cancer Father Eddie Matthews 66        Oral    Hypertension Father Eddie Matthews     Crohn's disease Father Eddie Matthews     Thyroid disease Sister Zahra Tavai     Rheum arthritis Sister Zahra Tavai     Hashimoto's thyroiditis Sister Zahra Tavai     Raynaud syndrome Sister Zahra Tavai     Arthritis Sister Zahra Tavai         rheumatoid    Kidney disease Maternal Grandmother Swapna Mendez     Stroke Paternal Grandfather Nico Vanessa     Rheum arthritis Paternal Grandfather Nico Mendez        Medications Ordered                [facility][id number] - [place] - [address] AT [facility] [place]   [address] [address]    Telephone:  [phone]   Fax: [phone]   Hours: Open 24 hours                         E-Prescribed (3 of 3)              amoxicillin-clavulanate 875-125mg (AUGMENTIN) 875-125 mg per tablet    Sig: Take 1 tablet by mouth every 12 (twelve) hours. for 7 days       Start: 25     Quantity: 14 tablet Refills: 0                         fluticasone propionate (FLONASE) 50 mcg/actuation nasal spray    Si spray (50 mcg total) by Each Nostril route once daily.       Start: 25     Quantity: 15.8 mL Refills: 0                         predniSONE (DELTASONE) 20 MG tablet    Sig: Take 1 tablet (20 mg total) by mouth once daily. for 5 days       Start: 25     Quantity: 5 tablet Refills: 0                           Ohs Peq Odvv Intake    2025 10:46 AM CDT - Filed by Patient   What is your current physical address in the event of a medical emergency? [address], [place] [address]   Are you able to take your vital signs? No   Please attach any relevant images or files    Is your employer contracted with [facility]? No         43 yo female c/o sore throat, post nasal drip, hoarseness, nasal congestion, sinus HA, productive cough, sinus pressure for the past week.   Traveling for work recently and sx started after, believed it to be due to allergies.   Took zyrtec, Cepacol/lozenges, ibuprofen as needed with minimal relief of sx.   Denies fever, chills, SOB, wheezing.         Constitution: Negative for chills and fever.   HENT:  Positive for congestion, postnasal drip, sinus pain, sinus pressure, sore throat and voice change. Negative for ear pain and trouble swallowing.    Respiratory:  Positive for cough and sputum production. Negative for shortness of breath and wheezing.    Gastrointestinal:  Negative for nausea and vomiting.        Objective:   The physical exam was conducted virtually.    AAO x 3 ; no acute distress noted; appearance normal; mood and behavior normal; thought process  normal  Head- normocephalic  Nose- nasal congestion, sinus pressure  Eyes- pupils appear normal in size, no drainage, no erythema  Ears- normal appearing; no discharge, no erythema  Mouth- appears normal  Oropharynx- no erythema, lesions  Lungs- breathing at a normal rate, no acute distress noted  Heart- no reports of tachycardia, palpitations, chest pain  Abdomen- non distended, non tender reported by patient  Skin- warm and dry, no erythema or edema noted by patient or visualized  Psych- as above; no si/hi      Assessment:     1. Acute maxillary sinusitis, recurrence not specified    2. Hoarseness of voice        Plan:     -Below are suggestions for symptomatic relief:              -Tylenol every 4 hours OR ibuprofen every 6 hours as needed for pain/fever.              -Salt water gargles to soothe throat pain.              -Chloroseptic spray also helps to numb throat pain.              -Nasal saline spray reduces inflammation and dryness.              -Warm face compresses to help with facial sinus pain/pressure.              -Vicks vapor rub at night.              -Flonase OTC or Nasacort OTC for nasal congestion.              -Simple foods like chicken noodle soup.              -Delsym helps with coughing at night              -Zyrtec/Claritin during the day & Benadryl at night may help with allergies.     If you DO NOT have Hypertension or any history of palpitations, it is ok to take over the counter Sudafed or Mucinex D or Allegra-D or Claritin-D or Zyrtec-D.  If you do take one of the above, it is ok to combine that with plain over the counter Mucinex or Allegra or Claritin or Zyrtec. If, for example, you are taking Zyrtec -D, you can combine that with Mucinex, but not Mucinex-D.  If you are taking Mucinex-D, you can combine that with plain Allegra or Claritin or Zyrtec.   If you DO have Hypertension or palpitations, it is safe to take Coricidin HBP for relief of sinus symptoms.     Please follow up with  your Primary care provider within 2-5 days if your signs and symptoms have not resolved or worsen.      If your condition worsens or fails to improve we recommend that you receive another evaluation at the emergency room immediately or contact your primary medical clinic to discuss your concerns.   You must understand that you have received an Urgent Care treatment only and that you may be released before all of your medical problems are known or treated. You, the patient, will arrange for follow up care as instructed.      RED FLAGS/WARNING SYMPTOMS DISCUSSED WITH PATIENT THAT WOULD WARRANT EMERGENT MEDICAL ATTENTION. PATIENT VERBALIZED UNDERSTANDING.       Thank you for choosing [facility]!    Our goal in the [facility] is to always provide outstanding medical care. You may receive a survey by mail or e-mail in the next week regarding your experience today. We would greatly appreciate you completing and returning the survey. Your feedback provides us with a way to recognize our staff who provide very good care, and it helps us learn how to improve when your experience was below our aspiration of excellence.         We appreciate you trusting us with your medical care. We hope you feel better soon. We will be happy to take care of you for all of your future medical needs.    You must understand that you've received an Urgent Care treatment only and that you may be released before all your medical problems are known or treated. You, the patient, will arrange for follow up care as instructed.    Follow up with your PCP or specialty clinic as directed in the next 1-2 weeks if not improved or as needed.  You can call [phone] to schedule an appointment with the appropriate provider.    If your condition worsens we recommend that you receive another evaluation in person, with your primary care provider, urgent care or at the emergency room immediately or contact your primary  medical clinics after hours call service to discuss your concerns.         Acute maxillary sinusitis, recurrence not specified  -     amoxicillin-clavulanate 875-125mg (AUGMENTIN) 875-125 mg per tablet; Take 1 tablet by mouth every 12 (twelve) hours. for 7 days  Dispense: 14 tablet; Refill: 0  -     fluticasone propionate (FLONASE) 50 mcg/actuation nasal spray; 1 spray (50 mcg total) by Each Nostril route once daily.  Dispense: 15.8 mL; Refill: 0    Hoarseness of voice  -     predniSONE (DELTASONE) 20 MG tablet; Take 1 tablet (20 mg total) by mouth once daily. for 5 days  Dispense: 5 tablet; Refill: 0                         [1]   Current Outpatient Medications on File Prior to Visit   Medication Sig Dispense Refill    albuterol (PROVENTIL) 2.5 mg /3 mL (0.083 %) nebulizer solution Take 3 mLs (2.5 mg total) by nebulization every 6 (six) hours as needed for Wheezing. Rescue 45 mL 0    albuterol (VENTOLIN HFA) 90 mcg/actuation inhaler Inhale 2 puffs into the lungs every 6 (six) hours as needed for Wheezing. Rescue 18 g 5    ascorbic acid, vitamin C, (VITAMIN C) 100 MG tablet Take 100 mg by mouth once daily.      cetirizine (ZYRTEC) 10 MG tablet Take 10 mg by mouth once daily.      fluticasone-salmeterol diskus inhaler 100-50 mcg Inhale 1 puff into the lungs 2 (two) times daily. Controller 180 each 3    spironolactone (ALDACTONE) 100 MG tablet 1/2 tab daily 1 tablet 0    vitamin D (VITAMIN D3) 1000 units Tab Take 1,000 Units by mouth once daily.       No current facility-administered medications on file prior to visit.